# Patient Record
Sex: FEMALE | Race: WHITE | HISPANIC OR LATINO
[De-identification: names, ages, dates, MRNs, and addresses within clinical notes are randomized per-mention and may not be internally consistent; named-entity substitution may affect disease eponyms.]

---

## 2017-01-25 ENCOUNTER — APPOINTMENT (OUTPATIENT)
Dept: INTERNAL MEDICINE | Facility: CLINIC | Age: 82
End: 2017-01-25

## 2017-01-25 VITALS
HEIGHT: 60 IN | WEIGHT: 113 LBS | OXYGEN SATURATION: 96 % | SYSTOLIC BLOOD PRESSURE: 116 MMHG | TEMPERATURE: 98 F | BODY MASS INDEX: 22.19 KG/M2 | DIASTOLIC BLOOD PRESSURE: 58 MMHG | HEART RATE: 68 BPM

## 2017-01-26 LAB
ALBUMIN SERPL ELPH-MCNC: 4 G/DL
ALP BLD-CCNC: 136 U/L
ALT SERPL-CCNC: 17 U/L
AMYLASE/CREAT SERPL: 65 U/L
AST SERPL-CCNC: 21 U/L
BASOPHILS # BLD AUTO: 0.02 K/UL
BASOPHILS NFR BLD AUTO: 0.5 %
BILIRUB DIRECT SERPL-MCNC: 0.1 MG/DL
BILIRUB INDIRECT SERPL-MCNC: 0.1 MG/DL
BILIRUB SERPL-MCNC: <0.2 MG/DL
EOSINOPHIL # BLD AUTO: 0.16 K/UL
EOSINOPHIL NFR BLD AUTO: 4.2 %
FERRITIN SERPL-MCNC: 97.7 NG/ML
FOLATE SERPL-MCNC: >20 NG/ML
HCT VFR BLD CALC: 36.3 %
HGB BLD-MCNC: 11.5 G/DL
IMM GRANULOCYTES NFR BLD AUTO: 0 %
LPL SERPL-CCNC: 39 U/L
LYMPHOCYTES # BLD AUTO: 1.54 K/UL
LYMPHOCYTES NFR BLD AUTO: 40.4 %
MAN DIFF?: NORMAL
MCHC RBC-ENTMCNC: 30.9 PG
MCHC RBC-ENTMCNC: 31.7 GM/DL
MCV RBC AUTO: 97.6 FL
MONOCYTES # BLD AUTO: 0.29 K/UL
MONOCYTES NFR BLD AUTO: 7.6 %
NEUTROPHILS # BLD AUTO: 1.8 K/UL
NEUTROPHILS NFR BLD AUTO: 47.3 %
PLATELET # BLD AUTO: 191 K/UL
PROT SERPL-MCNC: 7 G/DL
RBC # BLD: 3.72 M/UL
RBC # FLD: 13.1 %
VIT B12 SERPL-MCNC: 579 PG/ML
WBC # FLD AUTO: 3.81 K/UL

## 2017-02-24 ENCOUNTER — FORM ENCOUNTER (OUTPATIENT)
Age: 82
End: 2017-02-24

## 2017-02-25 ENCOUNTER — APPOINTMENT (OUTPATIENT)
Dept: ULTRASOUND IMAGING | Facility: IMAGING CENTER | Age: 82
End: 2017-02-25

## 2017-02-25 ENCOUNTER — APPOINTMENT (OUTPATIENT)
Dept: RADIOLOGY | Facility: IMAGING CENTER | Age: 82
End: 2017-02-25

## 2017-02-25 ENCOUNTER — OUTPATIENT (OUTPATIENT)
Dept: OUTPATIENT SERVICES | Facility: HOSPITAL | Age: 82
LOS: 1 days | End: 2017-02-25
Payer: MEDICARE

## 2017-02-25 DIAGNOSIS — Z00.8 ENCOUNTER FOR OTHER GENERAL EXAMINATION: ICD-10-CM

## 2017-02-25 DIAGNOSIS — Z90.710 ACQUIRED ABSENCE OF BOTH CERVIX AND UTERUS: Chronic | ICD-10-CM

## 2017-02-25 PROCEDURE — 76700 US EXAM ABDOM COMPLETE: CPT

## 2017-02-25 PROCEDURE — 71046 X-RAY EXAM CHEST 2 VIEWS: CPT

## 2017-03-01 DIAGNOSIS — J18.9 PNEUMONIA, UNSPECIFIED ORGANISM: ICD-10-CM

## 2017-03-01 DIAGNOSIS — R10.9 UNSPECIFIED ABDOMINAL PAIN: ICD-10-CM

## 2017-03-28 ENCOUNTER — APPOINTMENT (OUTPATIENT)
Dept: INTERNAL MEDICINE | Facility: CLINIC | Age: 82
End: 2017-03-28

## 2017-03-28 VITALS
OXYGEN SATURATION: 97 % | HEIGHT: 60 IN | TEMPERATURE: 97.8 F | DIASTOLIC BLOOD PRESSURE: 50 MMHG | WEIGHT: 114 LBS | HEART RATE: 73 BPM | BODY MASS INDEX: 22.38 KG/M2 | SYSTOLIC BLOOD PRESSURE: 120 MMHG

## 2017-03-28 DIAGNOSIS — I10 ESSENTIAL (PRIMARY) HYPERTENSION: ICD-10-CM

## 2017-03-28 DIAGNOSIS — R10.9 UNSPECIFIED ABDOMINAL PAIN: ICD-10-CM

## 2017-06-08 ENCOUNTER — APPOINTMENT (OUTPATIENT)
Dept: INTERNAL MEDICINE | Facility: CLINIC | Age: 82
End: 2017-06-08

## 2017-06-08 VITALS
HEIGHT: 60 IN | BODY MASS INDEX: 22.78 KG/M2 | HEART RATE: 80 BPM | WEIGHT: 116 LBS | DIASTOLIC BLOOD PRESSURE: 56 MMHG | OXYGEN SATURATION: 98 % | SYSTOLIC BLOOD PRESSURE: 118 MMHG | TEMPERATURE: 97.8 F

## 2017-06-08 LAB — S PYO AG SPEC QL IA: NEGATIVE

## 2017-06-08 RX ORDER — BENZONATATE 100 MG/1
100 CAPSULE ORAL
Qty: 42 | Refills: 0 | Status: ACTIVE | COMMUNITY
Start: 2017-06-08 | End: 1900-01-01

## 2017-06-08 RX ORDER — FLUTICASONE PROPIONATE 50 UG/1
50 SPRAY, METERED NASAL TWICE DAILY
Qty: 1 | Refills: 0 | Status: ACTIVE | COMMUNITY
Start: 2017-06-08 | End: 1900-01-01

## 2017-06-09 LAB
RAPID RVP RESULT: DETECTED
RV+EV RNA SPEC QL NAA+PROBE: DETECTED

## 2017-06-15 ENCOUNTER — APPOINTMENT (OUTPATIENT)
Dept: INTERNAL MEDICINE | Facility: CLINIC | Age: 82
End: 2017-06-15

## 2017-06-15 VITALS
TEMPERATURE: 98 F | HEIGHT: 60 IN | HEART RATE: 71 BPM | SYSTOLIC BLOOD PRESSURE: 122 MMHG | DIASTOLIC BLOOD PRESSURE: 64 MMHG | OXYGEN SATURATION: 98 % | BODY MASS INDEX: 22.78 KG/M2 | WEIGHT: 116 LBS

## 2017-06-15 DIAGNOSIS — J06.9 ACUTE UPPER RESPIRATORY INFECTION, UNSPECIFIED: ICD-10-CM

## 2017-06-29 ENCOUNTER — MEDICATION RENEWAL (OUTPATIENT)
Age: 82
End: 2017-06-29

## 2017-06-29 RX ORDER — LEVOCETIRIZINE DIHYDROCHLORIDE 5 MG/1
5 TABLET ORAL DAILY
Qty: 15 | Refills: 0 | Status: ACTIVE | COMMUNITY
Start: 2017-06-08 | End: 1900-01-01

## 2017-10-03 ENCOUNTER — APPOINTMENT (OUTPATIENT)
Dept: INTERNAL MEDICINE | Facility: CLINIC | Age: 82
End: 2017-10-03
Payer: MEDICARE

## 2017-10-03 VITALS
TEMPERATURE: 97.9 F | OXYGEN SATURATION: 96 % | HEIGHT: 60 IN | HEART RATE: 72 BPM | WEIGHT: 116 LBS | BODY MASS INDEX: 22.78 KG/M2 | DIASTOLIC BLOOD PRESSURE: 60 MMHG | SYSTOLIC BLOOD PRESSURE: 130 MMHG

## 2017-10-03 DIAGNOSIS — G89.29 PAIN IN RIGHT KNEE: ICD-10-CM

## 2017-10-03 DIAGNOSIS — M25.561 PAIN IN RIGHT KNEE: ICD-10-CM

## 2017-10-03 PROCEDURE — 99213 OFFICE O/P EST LOW 20 MIN: CPT | Mod: 25

## 2017-10-03 PROCEDURE — G0008: CPT

## 2017-10-03 PROCEDURE — 90662 IIV NO PRSV INCREASED AG IM: CPT

## 2018-02-22 ENCOUNTER — OTHER (OUTPATIENT)
Age: 83
End: 2018-02-22

## 2018-03-16 ENCOUNTER — INPATIENT (INPATIENT)
Facility: HOSPITAL | Age: 83
LOS: 1 days | Discharge: ANOTHER HOSPITAL | End: 2018-03-18
Attending: INTERNAL MEDICINE | Admitting: INTERNAL MEDICINE
Payer: MEDICARE

## 2018-03-16 VITALS
SYSTOLIC BLOOD PRESSURE: 180 MMHG | OXYGEN SATURATION: 98 % | HEIGHT: 60 IN | HEART RATE: 78 BPM | DIASTOLIC BLOOD PRESSURE: 97 MMHG | WEIGHT: 104.94 LBS | RESPIRATION RATE: 16 BRPM

## 2018-03-16 DIAGNOSIS — I62.00 NONTRAUMATIC SUBDURAL HEMORRHAGE, UNSPECIFIED: ICD-10-CM

## 2018-03-16 DIAGNOSIS — R55 SYNCOPE AND COLLAPSE: ICD-10-CM

## 2018-03-16 DIAGNOSIS — Z90.710 ACQUIRED ABSENCE OF BOTH CERVIX AND UTERUS: Chronic | ICD-10-CM

## 2018-03-16 DIAGNOSIS — I10 ESSENTIAL (PRIMARY) HYPERTENSION: ICD-10-CM

## 2018-03-16 LAB
ALBUMIN SERPL ELPH-MCNC: 3.9 G/DL — SIGNIFICANT CHANGE UP (ref 3.3–5)
ALP SERPL-CCNC: 169 U/L — HIGH (ref 40–120)
ALT FLD-CCNC: 21 U/L — SIGNIFICANT CHANGE UP (ref 12–78)
ANION GAP SERPL CALC-SCNC: 6 MMOL/L — SIGNIFICANT CHANGE UP (ref 5–17)
APTT BLD: 29.4 SEC — SIGNIFICANT CHANGE UP (ref 27.5–37.4)
AST SERPL-CCNC: 25 U/L — SIGNIFICANT CHANGE UP (ref 15–37)
BILIRUB SERPL-MCNC: 0.4 MG/DL — SIGNIFICANT CHANGE UP (ref 0.2–1.2)
BUN SERPL-MCNC: 11 MG/DL — SIGNIFICANT CHANGE UP (ref 7–23)
CALCIUM SERPL-MCNC: 8.9 MG/DL — SIGNIFICANT CHANGE UP (ref 8.5–10.1)
CHLORIDE SERPL-SCNC: 97 MMOL/L — SIGNIFICANT CHANGE UP (ref 96–108)
CK SERPL-CCNC: 79 U/L — SIGNIFICANT CHANGE UP (ref 26–192)
CK SERPL-CCNC: 89 U/L — SIGNIFICANT CHANGE UP (ref 26–192)
CO2 SERPL-SCNC: 31 MMOL/L — SIGNIFICANT CHANGE UP (ref 22–31)
CREAT SERPL-MCNC: 0.6 MG/DL — SIGNIFICANT CHANGE UP (ref 0.5–1.3)
GLUCOSE SERPL-MCNC: 130 MG/DL — HIGH (ref 70–99)
HCT VFR BLD CALC: 39.5 % — SIGNIFICANT CHANGE UP (ref 34.5–45)
HGB BLD-MCNC: 13.8 G/DL — SIGNIFICANT CHANGE UP (ref 11.5–15.5)
INR BLD: 0.98 RATIO — SIGNIFICANT CHANGE UP (ref 0.88–1.16)
MCHC RBC-ENTMCNC: 32.8 PG — SIGNIFICANT CHANGE UP (ref 27–34)
MCHC RBC-ENTMCNC: 34.9 GM/DL — SIGNIFICANT CHANGE UP (ref 32–36)
MCV RBC AUTO: 93.8 FL — SIGNIFICANT CHANGE UP (ref 80–100)
NRBC # BLD: 0 /100 WBCS — SIGNIFICANT CHANGE UP (ref 0–0)
PLATELET # BLD AUTO: 192 K/UL — SIGNIFICANT CHANGE UP (ref 150–400)
POTASSIUM SERPL-MCNC: 3.9 MMOL/L — SIGNIFICANT CHANGE UP (ref 3.5–5.3)
POTASSIUM SERPL-SCNC: 3.9 MMOL/L — SIGNIFICANT CHANGE UP (ref 3.5–5.3)
PROT SERPL-MCNC: 8 GM/DL — SIGNIFICANT CHANGE UP (ref 6–8.3)
PROTHROM AB SERPL-ACNC: 10.7 SEC — SIGNIFICANT CHANGE UP (ref 9.8–12.7)
RBC # BLD: 4.21 M/UL — SIGNIFICANT CHANGE UP (ref 3.8–5.2)
RBC # FLD: 12.1 % — SIGNIFICANT CHANGE UP (ref 10.3–14.5)
SODIUM SERPL-SCNC: 134 MMOL/L — LOW (ref 135–145)
TROPONIN I SERPL-MCNC: <.015 NG/ML — SIGNIFICANT CHANGE UP (ref 0.01–0.04)
WBC # BLD: 3.93 K/UL — SIGNIFICANT CHANGE UP (ref 3.8–10.5)
WBC # FLD AUTO: 3.93 K/UL — SIGNIFICANT CHANGE UP (ref 3.8–10.5)

## 2018-03-16 PROCEDURE — 76377 3D RENDER W/INTRP POSTPROCES: CPT | Mod: 26

## 2018-03-16 PROCEDURE — 93010 ELECTROCARDIOGRAM REPORT: CPT

## 2018-03-16 PROCEDURE — 70450 CT HEAD/BRAIN W/O DYE: CPT | Mod: 26

## 2018-03-16 PROCEDURE — 72125 CT NECK SPINE W/O DYE: CPT | Mod: 26

## 2018-03-16 PROCEDURE — 99284 EMERGENCY DEPT VISIT MOD MDM: CPT

## 2018-03-16 PROCEDURE — 72170 X-RAY EXAM OF PELVIS: CPT | Mod: 26

## 2018-03-16 RX ORDER — METOPROLOL TARTRATE 50 MG
5 TABLET ORAL ONCE
Qty: 0 | Refills: 0 | Status: DISCONTINUED | OUTPATIENT
Start: 2018-03-16 | End: 2018-03-16

## 2018-03-16 RX ORDER — HYDRALAZINE HCL 50 MG
10 TABLET ORAL ONCE
Qty: 0 | Refills: 0 | Status: COMPLETED | OUTPATIENT
Start: 2018-03-16 | End: 2018-03-16

## 2018-03-16 RX ORDER — ACETAMINOPHEN 500 MG
650 TABLET ORAL ONCE
Qty: 0 | Refills: 0 | Status: DISCONTINUED | OUTPATIENT
Start: 2018-03-16 | End: 2018-03-16

## 2018-03-16 RX ORDER — DOCUSATE SODIUM 100 MG
100 CAPSULE ORAL THREE TIMES A DAY
Qty: 0 | Refills: 0 | Status: DISCONTINUED | OUTPATIENT
Start: 2018-03-16 | End: 2018-03-16

## 2018-03-16 RX ORDER — METOPROLOL TARTRATE 50 MG
5 TABLET ORAL ONCE
Qty: 0 | Refills: 0 | Status: COMPLETED | OUTPATIENT
Start: 2018-03-16 | End: 2018-03-16

## 2018-03-16 RX ORDER — PANTOPRAZOLE SODIUM 20 MG/1
40 TABLET, DELAYED RELEASE ORAL
Qty: 0 | Refills: 0 | Status: DISCONTINUED | OUTPATIENT
Start: 2018-03-16 | End: 2018-03-18

## 2018-03-16 RX ORDER — DOCUSATE SODIUM 100 MG
100 CAPSULE ORAL THREE TIMES A DAY
Qty: 0 | Refills: 0 | Status: DISCONTINUED | OUTPATIENT
Start: 2018-03-16 | End: 2018-03-18

## 2018-03-16 RX ORDER — ACETAMINOPHEN 500 MG
650 TABLET ORAL EVERY 6 HOURS
Qty: 0 | Refills: 0 | Status: DISCONTINUED | OUTPATIENT
Start: 2018-03-16 | End: 2018-03-18

## 2018-03-16 RX ORDER — METOPROLOL TARTRATE 50 MG
25 TABLET ORAL
Qty: 0 | Refills: 0 | Status: DISCONTINUED | OUTPATIENT
Start: 2018-03-16 | End: 2018-03-18

## 2018-03-16 RX ORDER — ONDANSETRON 8 MG/1
4 TABLET, FILM COATED ORAL EVERY 6 HOURS
Qty: 0 | Refills: 0 | Status: DISCONTINUED | OUTPATIENT
Start: 2018-03-16 | End: 2018-03-18

## 2018-03-16 RX ORDER — ACETAMINOPHEN 500 MG
650 TABLET ORAL EVERY 6 HOURS
Qty: 0 | Refills: 0 | Status: DISCONTINUED | OUTPATIENT
Start: 2018-03-16 | End: 2018-03-16

## 2018-03-16 RX ORDER — ACETAMINOPHEN 500 MG
650 TABLET ORAL ONCE
Qty: 0 | Refills: 0 | Status: COMPLETED | OUTPATIENT
Start: 2018-03-16 | End: 2018-03-16

## 2018-03-16 RX ADMIN — Medication 650 MILLIGRAM(S): at 10:14

## 2018-03-16 RX ADMIN — Medication 5 MILLIGRAM(S): at 13:50

## 2018-03-16 RX ADMIN — Medication 5 MILLIGRAM(S): at 11:22

## 2018-03-16 RX ADMIN — Medication 25 MILLIGRAM(S): at 18:57

## 2018-03-16 RX ADMIN — Medication 650 MILLIGRAM(S): at 17:27

## 2018-03-16 RX ADMIN — Medication 650 MILLIGRAM(S): at 11:15

## 2018-03-16 RX ADMIN — Medication 650 MILLIGRAM(S): at 15:13

## 2018-03-16 NOTE — ED PROVIDER NOTE - CARE PLAN
Principal Discharge DX:	Acute non intractable tension-type headache Principal Discharge DX:	Subdural hematoma

## 2018-03-16 NOTE — CONSULT NOTE ADULT - ASSESSMENT
Historian:     Pt poor historian.  ER notes reviewed.  Brain CT:  pending  HPI:    SALVADOR MARKS.  · Chief Complaint: The patient is a 89y Female complaining of dizziness.  · HPI Objective Statement: 89 yo F with dizziness.  Presents s/p fall at home.  Pt. complains of headache and dizziness after the fall.     	Used [employer]  to speak with patient.  She says she's doesn't recall exactly what happened.  No other complaints.  No other traumatic injury.      	ROS: negative for fever, cough, chest pain, shortness of breath, abd pain, nausea, vomiting, diarrhea, rash, paresthesia, and weakness.   PMH: gerd; Meds: See EMR; SH: Denies smoking/drinking/drug use      PAST MEDICAL & SURGICAL HISTORY:  No pertinent past medical history  S/P hysterectomy  89yFemale    MEDICATIONS  (STANDING):  docusate sodium 100 milliGRAM(s) Oral three times a day  metoprolol     tartrate 25 milliGRAM(s) Oral two times a day  pantoprazole    Tablet 40 milliGRAM(s) Oral before breakfast    MEDICATIONS  (PRN):  acetaminophen   Tablet. 650 milliGRAM(s) Oral every 6 hours PRN Mild Pain (1 - 3)  ondansetron Injectable 4 milliGRAM(s) IV Push every 6 hours PRN Nausea      Allergies    No Known Allergies    Intolerances      FAMILY HISTORY:  No pertinent family history in first degree relatives    Vital Signs Last 24 Hrs  T(C): --  T(F): --  HR: 61 (16 Mar 2018 11:25) (61 - 78)  BP: 192/72 (16 Mar 2018 11:25) (180/97 - 194/67)  BP(mean): --  RR: 16 (16 Mar 2018 09:16) (16 - 16)  SpO2: 98% (16 Mar 2018 09:16) (98% - 98%)    NEUROLOGICAL EXAM:  HENT:  Normocephalic head; atraumatic head.  Neck supple.  ENT: normal looking.  Mental State:    Alert.  Fully oriented to person, place and date.  Coherent.  Speech clear and intact.  Cooperative.  Responds appropriately.    Cranial Nerves:  II-XII:   Pupils round and reactive to light and accommodation.  Extraocular movements full.  Visual fields full (no homonymous hemianopsia).  Visual acuity wnl.  Facial symmetry intact.  Tongue midline.  Motor Functions:  Moves all extremities.  No pronator drift of UE.  Claps hand well.  Hand  intact bilaterally.  Ambulatory.    Sensory Functions:   Intact to touch and pinprick to face and extremities.    Reflexes:  Deep tendon reflexes normoactive to biceps, knees and ankles.  Babinski absent (present).  Cerebellar Testing:    Finger to nose intact.  Nystagmus absent.  Neurovascular: Carotid auscultation full without bruits.      LABS:                        13.8   3.93  )-----------( 192      ( 16 Mar 2018 10:19 )             39.5     03-16    134<L>  |  97  |  11  ----------------------------<  130<H>  3.9   |  31  |  0.60    Ca    8.9      16 Mar 2018 10:19    TPro  8.0  /  Alb  3.9  /  TBili  0.4  /  DBili  x   /  AST  25  /  ALT  21  /  AlkPhos  169<H>  03-16    PT/INR - ( 16 Mar 2018 10:19 )   PT: 10.7 sec;   INR: 0.98 ratio         PTT - ( 16 Mar 2018 10:19 )  PTT:29.4 sec        RADIOLOGY & ADDITIONAL STUDIES:    CT Head No Cont: Urgent   Indication: headache  Transport: Stretcher-Crib  Exam Completed  Provider's Contact #: [phone] (03-16 @ 09:23)  CT Cervical Spine No Cont: Urgent   Indication: neck pain  Transport: Stretcher-Crib  Exam Completed  Provider's Contact #: [phone] (03-16 @ 09:23)  Xray Chest 1 View AP/PA.: Urgent   Indication: chest pain  Transport: Stretcher-Crib  Provider's Contact #: [phone] (03-16 @ 09:23)  Xray Pelvis AP only: Urgent   Indication: pelvic pain  Transport: Stretcher-Crib  Provider's Contact #: [phone] (03-16 @ 09:23)  Complete Blood Count: STAT (03-16 @ 09:23)  Comprehensive Metabolic Panel: STAT (03-16 @ 09:23)  12 Lead ECG:   Provider's Contact #: [phone] (03-16 @ 09:23)  IV Insert:     Time/Priority:  STAT (03-16 @ 09:23)  Prothrombin Time and INR, Plasma:  Start Date:16-Mar-2018. STAT (03-16 @ 09:23)  Activated Partial Thromboplastin Time:  Start Date:16-Mar-2018. STAT (03-16 @ 09:23)  Type + Screen: STAT (03-16 @ 09:23)  ABO Rh Confirmatory Specimen: STAT  Addl Info: Conditional: ABO Rh Confirmatory Specimen (03-16 @ 09:23)  Urinalysis: STAT (03-16 @ 09:29)  Culture - Urine: Routine  Specimen Source: Clean Catch (Midstream) (03-16 @ 09:29)  acetaminophen   Tablet.: [Known as TYLENOL.]  650 milliGRAM(s), Oral, once, Stop After 1 Doses  Administration Instructions: MAX DAILY DOSE:  ADULT = 4,000 mG/Day (03-16 @ 09:32)  Troponin I, Serum: STAT (03-16 @ 09:33)  Antibody Screen: 10:12 (03-16 @ 10:19)  metoprolol    tartrate Injectable: [Known as LOPRESSOR Injectable]  5 milliGRAM(s), IV Push, Once, Stop After 1 Doses  Special Instructions: check pressure before dispensing  Provider's Contact #: [phone] (03-16 @ 11:05)  Admit to Inpatient Level of Care.:     Service:  TELEMETRY    Physician:  Bianca Hastings    Additional Instructions:  Diagnosis: Subdural hematoma  Isolation: None  Special Consideration: None (03-16 @ 11:08)  CT 3D Reconstruct w/ Workstation: 11:15  Exam in Progress (03-16 @ 11:15)  Admit from ED (03-16 @ 11:18)  Intermittent Pneumatic Compression:     Body Side:  Bilateral    Additional Instructions:  Apply device now and remove only for bathing and skin evaluation as per nursing protocol. (03-16 @ 11:19)  Height/Length:     Frequency:  on admission (03-16 @ 11:19)  Weight:     Frequency:  on admission (03-16 @ 11:19)  Vital Signs:     Frequency:  every 8 hours (03-16 @ 11:19)  Vital Signs:     Frequency:  every 6 hours (03-16 @ 11:19)  Assess Neurological Status:     Type of Neuro Check:  General    Frequency:  every 4 hours (03-16 @ 11:19)  Fall Risk Protocol:     Time/Priority:  Routine (03-16 @ 11:19)  Diet, DASH/TLC:   Sodium & Cholesterol Restricted (03-16 @ 11:19)  acetaminophen   Tablet.: [Known as TYLENOL.]  650 milliGRAM(s), Oral, every 6 hours, PRN for Mild Pain (1 - 3)  Administration Instructions: MAX DAILY DOSE:  ADULT = 4,000 mG/Day (03-16 @ 11:19)  ondansetron Injectable: [Known as ZOFRAN Injectable]  4 milliGRAM(s), IV Push, every 6 hours, PRN for Nausea  Administration Instructions: Max IV Push dose 8 milliGRAM(s)  IF IV PUSH, ADMINISTER OVER 2-5 MIN  Provider's Contact #: [phone] (03-16 @ 11:19)  docusate sodium: [Known as COLACE]  100 milliGRAM(s), Oral, three times a day  Provider's Contact #: [phone] (03-16 @ 11:19)  (ADM OVERRIDE):   Qty Removed: 1 each  Route - Dose Given <see task> (03-16 @ 11:22)  pantoprazole    Tablet: [Known as PROTONIX   DR]  40 milliGRAM(s), Oral, before breakfast  Administration Instructions: swallow whole * don't crush/chew (03-16 @ 11:23)  US Duplex Carotid Arteries Complete, Bilateral: Routine   Indication: Syncope  Transport: Stretcher-Crib  Provider's Contact #: [phone] (03-16 @ 11:23)  Troponin I, Serum: Repeat From: 16-Mar-2018 15:00 To: 17-Mar-2018 07:00, Every 8 hr(s) (03-16 @ 11:23)  Troponin I, Serum: 15:00 (03-16 @ 11:23)  Creatine Kinase, Serum: Repeat From: 16-Mar-2018 15:00 To: 17-Mar-2018 07:00, Every 8 hr(s) (03-16 @ 11:23)  Creatine Kinase, Serum: 15:00 (03-16 @ 11:23)  metoprolol     tartrate: [Ordered as LOPRESSOR]  25 milliGRAM(s), Oral, two times a day  Special Instructions: hold for HR<60  Provider's Contact #: [phone] (03-16 @ 11:23)  Troponin I, Serum: 23:00 (03-16 @ 11:23)  Creatine Kinase, Serum: 23:00 (03-16 @ 11:23)  CT Head No Cont: AM   Indication: Subdural hematoma  Transport: Stretcher-Crib  Provider's Contact #: [phone] (03-16 @ 11:27)  Complete Blood Count: AM Sched. Collection: 17-Mar-2018 05:00 (03-16 @ 11:27)  Comprehensive Metabolic Panel: AM Sched. Collection: 17-Mar-2018 05:00 (03-16 @ 11:27)  (ADM OVERRIDE):   Qty Removed: 1 each  Route - Dose Given <see task> (03-16 @ 11:48)    Assessment & Opinion:   Transient Ischemic Attack    Recommendations:  Brain MRI.  Carotid doppler.  Echocardiogram.  EEG.   DVT prophylaxis as ordered.  Medications: Historian:     Pt poor historian.  ER notes reviewed.  Brain CT:   Acute holohemispheric right subdural hematoma measuring 7 mm in maximal depth with 3 mm of leftward midline shift. Small foci of subarachnoid hemorrhage within the frontal lobes as well as trace left frontal subdural hematoma.   HPI:    SALVADOR MARKS.  · Chief Complaint: The patient is a 89y Female complaining of dizziness.  · HPI Objective Statement: 89 yo F with dizziness.  Presents s/p fall at home.  Pt. complains of headache and dizziness after the fall.     	Used [employer]  to speak with patient.  She says she's doesn't recall exactly what happened.  No other complaints.  No other traumatic injury.      	ROS: negative for fever, cough, chest pain, shortness of breath, abd pain, nausea, vomiting, diarrhea, rash, paresthesia, and weakness.   PMH: gerd; Meds: See EMR; SH: Denies smoking/drinking/drug use    PAST MEDICAL & SURGICAL HISTORY:  No pertinent past medical history; S/P hysterectomy    MEDICATIONS  (STANDING):  docusate sodium 100 milliGRAM(s) Oral three times a day  metoprolol     tartrate 25 milliGRAM(s) Oral two times a day  pantoprazole    Tablet 40 milliGRAM(s) Oral before breakfast    MEDICATIONS  (PRN):  acetaminophen   Tablet. 650 milliGRAM(s) Oral every 6 hours PRN Mild Pain (1 - 3)  ondansetron Injectable 4 milliGRAM(s) IV Push every 6 hours PRN Nausea    Allergies: No Known Allergies  Intolerances  FAMILY HISTORY:  No pertinent family history in first degree relatives    Vital Signs Last 24 Hrs  T(C): --  T(F): --  HR: 61 (16 Mar 2018 11:25) (61 - 78)  BP: 192/72 (16 Mar 2018 11:25) (180/97 - 194/67)  BP(mean): --  RR: 16 (16 Mar 2018 09:16) (16 - 16)  SpO2: 98% (16 Mar 2018 09:16) (98% - 98%)    NEUROLOGICAL EXAM:  HENT:  Normocephalic head; atraumatic head.  Neck supple.  ENT: normal looking.  Mental State:    Awake.  Poorly oriented to place and date.   Speech clear.   Cooperative.  Responds fairly appropriately.    Cranial Nerves:   Pupils round and reactive to light.   Extraocular movements full.   Visual acuity counts fingers.   Facial symmetry intact.  Tongue midline.  Motor Functions:  Moves all extremities weakly.  Sensory Functions:   Intact to touch and pinprick to extremities.    Reflexes:  Deep tendon reflexes normoactive to biceps, knees and ankles.  Babinski absent   Cerebellar Testing:    Claps hands well.   Neurovascular: Carotid auscultation full without bruits.      LABS:                        13.8   3.93  )-----------( 192      ( 16 Mar 2018 10:19 )             39.5     03-16    134<L>  |  97  |  11  ----------------------------<  130<H>  3.9   |  31  |  0.60    Ca    8.9      16 Mar 2018 10:19    TPro  8.0  /  Alb  3.9  /  TBili  0.4  /  DBili  x   /  AST  25  /  ALT  21  /  AlkPhos  169<H>  03-16    PT/INR - ( 16 Mar 2018 10:19 )   PT: 10.7 sec;   INR: 0.98 ratio       PTT - ( 16 Mar 2018 10:19 )  PTT:29.4 sec    RADIOLOGY & ADDITIONAL STUDIES:    CT Head No Cont: Urgent   Indication: headache  Transport: Stretcher-Crib  Exam Completed  Provider's Contact #: [phone] (03-16 @ 09:23)  CT Cervical Spine No Cont: Urgent   Indication: neck pain  Transport: Stretcher-Crib  Exam Completed  Provider's Contact #: [phone] (03-16 @ 09:23)  Xray Chest 1 View AP/PA.: Urgent   Indication: chest pain  Transport: Stretcher-Crib  Provider's Contact #: [phone] (03-16 @ 09:23)  Xray Pelvis AP only: Urgent   Indication: pelvic pain  Transport: Stretcher-Crib  Provider's Contact #: [phone] (03-16 @ 09:23)  Complete Blood Count: STAT (03-16 @ 09:23)  Comprehensive Metabolic Panel: STAT (03-16 @ 09:23)  12 Lead ECG:   Provider's Contact #: [phone] (03-16 @ 09:23)  IV Insert:     Time/Priority:  STAT (03-16 @ 09:23)  Prothrombin Time and INR, Plasma:  Start Date:16-Mar-2018. STAT (03-16 @ 09:23)  Activated Partial Thromboplastin Time:  Start Date:16-Mar-2018. STAT (03-16 @ 09:23)  Type + Screen: STAT (03-16 @ 09:23)  ABO Rh Confirmatory Specimen: STAT  Addl Info: Conditional: ABO Rh Confirmatory Specimen (03-16 @ 09:23)  Urinalysis: STAT (03-16 @ 09:29)  Culture - Urine: Routine  Specimen Source: Clean Catch (Midstream) (03-16 @ 09:29)  acetaminophen   Tablet.: [Known as TYLENOL.]  650 milliGRAM(s), Oral, once, Stop After 1 Doses  Administration Instructions: MAX DAILY DOSE:  ADULT = 4,000 mG/Day (03-16 @ 09:32)  Troponin I, Serum: STAT (03-16 @ 09:33)  Antibody Screen: 10:12 (03-16 @ 10:19)  metoprolol    tartrate Injectable: [Known as LOPRESSOR Injectable]  5 milliGRAM(s), IV Push, Once, Stop After 1 Doses  Special Instructions: check pressure before dispensing  Provider's Contact #: [phone] (03-16 @ 11:05)  Admit to Inpatient Level of Care.:     Service:  TELEMETRY    Physician:  Bianca Hastings    Additional Instructions:  Diagnosis: Subdural hematoma  Isolation: None  Special Consideration: None (03-16 @ 11:08)  CT 3D Reconstruct w/ Workstation: 11:15  Exam in Progress (03-16 @ 11:15)  Admit from ED (03-16 @ 11:18)  Intermittent Pneumatic Compression:     Body Side:  Bilateral    Additional Instructions:  Apply device now and remove only for bathing and skin evaluation as per nursing protocol. (03-16 @ 11:19)  Height/Length:     Frequency:  on admission (03-16 @ 11:19)  Weight:     Frequency:  on admission (03-16 @ 11:19)  Vital Signs:     Frequency:  every 8 hours (03-16 @ 11:19)  Vital Signs:     Frequency:  every 6 hours (03-16 @ 11:19)  Assess Neurological Status:     Type of Neuro Check:  General    Frequency:  every 4 hours (03-16 @ 11:19)  Fall Risk Protocol:     Time/Priority:  Routine (03-16 @ 11:19)  Diet, DASH/TLC:   Sodium & Cholesterol Restricted (03-16 @ 11:19)  acetaminophen   Tablet.: [Known as TYLENOL.]  650 milliGRAM(s), Oral, every 6 hours, PRN for Mild Pain (1 - 3)  Administration Instructions: MAX DAILY DOSE:  ADULT = 4,000 mG/Day (03-16 @ 11:19)  ondansetron Injectable: [Known as ZOFRAN Injectable]  4 milliGRAM(s), IV Push, every 6 hours, PRN for Nausea  Administration Instructions: Max IV Push dose 8 milliGRAM(s)  IF IV PUSH, ADMINISTER OVER 2-5 MIN  Provider's Contact #: [phone] (03-16 @ 11:19)  docusate sodium: [Known as COLACE]  100 milliGRAM(s), Oral, three times a day  Provider's Contact #: [phone] (03-16 @ 11:19)  (ADM OVERRIDE):   Qty Removed: 1 each  Route - Dose Given <see task> (03-16 @ 11:22)  pantoprazole    Tablet: [Known as PROTONIX   DR]  40 milliGRAM(s), Oral, before breakfast  Administration Instructions: swallow whole * don't crush/chew (03-16 @ 11:23)  US Duplex Carotid Arteries Complete, Bilateral: Routine   Indication: Syncope  Transport: Stretcher-Crib  Provider's Contact #: [phone] (03-16 @ 11:23)  Troponin I, Serum: Repeat From: 16-Mar-2018 15:00 To: 17-Mar-2018 07:00, Every 8 hr(s) (03-16 @ 11:23)  Troponin I, Serum: 15:00 (03-16 @ 11:23)  Creatine Kinase, Serum: Repeat From: 16-Mar-2018 15:00 To: 17-Mar-2018 07:00, Every 8 hr(s) (03-16 @ 11:23)  Creatine Kinase, Serum: 15:00 (03-16 @ 11:23)  metoprolol     tartrate: [Ordered as LOPRESSOR]  25 milliGRAM(s), Oral, two times a day  Special Instructions: hold for HR<60  Provider's Contact #: [phone] (03-16 @ 11:23)  Troponin I, Serum: 23:00 (03-16 @ 11:23)  Creatine Kinase, Serum: 23:00 (03-16 @ 11:23)  CT Head No Cont: AM   Indication: Subdural hematoma  Transport: Stretcher-Crib  Provider's Contact #: [phone] (03-16 @ 11:27)  Complete Blood Count: AM Sched. Collection: 17-Mar-2018 05:00 (03-16 @ 11:27)  Comprehensive Metabolic Panel: AM Sched. Collection: 17-Mar-2018 05:00 (03-16 @ 11:27)  (ADM OVERRIDE):   Qty Removed: 1 each  Route - Dose Given <see task> (03-16 @ 11:48)    Assessment & Opinion:   Transient Ischemic Attack.  Brain CT: Acute holohemispheric right subdural hematoma measuring 7 mm in maximal depth with 3 mm of leftward midline shift. Small foci of subarachnoid hemorrhage within the frontal lobes as well as trace left frontal subdural hematoma.     Recommendations:  Brain MRI.  Carotid doppler.  Echocardiogram.  EEG.   PT/OT.  DVT prophylaxis as ordered.  Medications:  Continue current medications Historian:     Pt poor historian.  ER notes reviewed.  Brain CT:   Acute holohemispheric right subdural hematoma measuring 7 mm in maximal depth with 3 mm of leftward midline shift. Small foci of subarachnoid hemorrhage within the frontal lobes as well as trace left frontal subdural hematoma.   HPI:    SALVADOR MARKS.  · Chief Complaint: The patient is a 89y Female complaining of dizziness.  · HPI Objective Statement: 89 yo F with dizziness.  Presents s/p fall at home.  Pt. complains of headache and dizziness after the fall.     	Used [employer]  to speak with patient.  She says she's doesn't recall exactly what happened.  No other complaints.  No other traumatic injury.      	ROS: negative for fever, cough, chest pain, shortness of breath, abd pain, nausea, vomiting, diarrhea, rash, paresthesia, and weakness.   PMH: gerd; Meds: See EMR; SH: Denies smoking/drinking/drug use    PAST MEDICAL & SURGICAL HISTORY:  No pertinent past medical history; S/P hysterectomy    MEDICATIONS  (STANDING):  docusate sodium 100 milliGRAM(s) Oral three times a day  metoprolol     tartrate 25 milliGRAM(s) Oral two times a day  pantoprazole    Tablet 40 milliGRAM(s) Oral before breakfast    MEDICATIONS  (PRN):  acetaminophen   Tablet. 650 milliGRAM(s) Oral every 6 hours PRN Mild Pain (1 - 3)  ondansetron Injectable 4 milliGRAM(s) IV Push every 6 hours PRN Nausea    Allergies: No Known Allergies  Intolerances  FAMILY HISTORY:  No pertinent family history in first degree relatives    Vital Signs Last 24 Hrs  T(C): --  T(F): --  HR: 61 (16 Mar 2018 11:25) (61 - 78)  BP: 192/72 (16 Mar 2018 11:25) (180/97 - 194/67)  BP(mean): --  RR: 16 (16 Mar 2018 09:16) (16 - 16)  SpO2: 98% (16 Mar 2018 09:16) (98% - 98%)    NEUROLOGICAL EXAM:  HENT:  Normocephalic head; atraumatic head.  Neck supple.  ENT: normal looking.  Mental State:    Awake.  Poorly oriented to place and date.   Speech clear.   Cooperative.  Responds fairly appropriately.    Cranial Nerves:   Pupils round and reactive to light.   Extraocular movements full.   Visual acuity counts fingers.   Facial symmetry intact.  Tongue midline.  Motor Functions:  Moves all extremities weakly.  Sensory Functions:   Intact to touch and pinprick to extremities.    Reflexes:  Deep tendon reflexes normoactive to biceps, knees and ankles.  Babinski absent   Cerebellar Testing:    Claps hands well.   Neurovascular: Carotid auscultation full without bruits.      LABS:                        13.8   3.93  )-----------( 192      ( 16 Mar 2018 10:19 )             39.5     03-16    134<L>  |  97  |  11  ----------------------------<  130<H>  3.9   |  31  |  0.60    Ca    8.9      16 Mar 2018 10:19    TPro  8.0  /  Alb  3.9  /  TBili  0.4  /  DBili  x   /  AST  25  /  ALT  21  /  AlkPhos  169<H>  03-16    PT/INR - ( 16 Mar 2018 10:19 )   PT: 10.7 sec;   INR: 0.98 ratio       PTT - ( 16 Mar 2018 10:19 )  PTT:29.4 sec    RADIOLOGY & ADDITIONAL STUDIES:    CT Head No Cont: Urgent   Indication: headache  Transport: Stretcher-Crib  Exam Completed  Provider's Contact #: [phone] (03-16 @ 09:23)  CT Cervical Spine No Cont: Urgent   Indication: neck pain  Transport: Stretcher-Crib  Exam Completed  Provider's Contact #: [phone] (03-16 @ 09:23)  Xray Chest 1 View AP/PA.: Urgent   Indication: chest pain  Transport: Stretcher-Crib  Provider's Contact #: [phone] (03-16 @ 09:23)  Xray Pelvis AP only: Urgent   Indication: pelvic pain  Transport: Stretcher-Crib  Provider's Contact #: [phone] (03-16 @ 09:23)  Complete Blood Count: STAT (03-16 @ 09:23)  Comprehensive Metabolic Panel: STAT (03-16 @ 09:23)  12 Lead ECG:   Provider's Contact #: [phone] (03-16 @ 09:23)  IV Insert:     Time/Priority:  STAT (03-16 @ 09:23)  Prothrombin Time and INR, Plasma:  Start Date:16-Mar-2018. STAT (03-16 @ 09:23)  Activated Partial Thromboplastin Time:  Start Date:16-Mar-2018. STAT (03-16 @ 09:23)  Type + Screen: STAT (03-16 @ 09:23)  ABO Rh Confirmatory Specimen: STAT  Addl Info: Conditional: ABO Rh Confirmatory Specimen (03-16 @ 09:23)  Urinalysis: STAT (03-16 @ 09:29)  Culture - Urine: Routine  Specimen Source: Clean Catch (Midstream) (03-16 @ 09:29)  acetaminophen   Tablet.: [Known as TYLENOL.]  650 milliGRAM(s), Oral, once, Stop After 1 Doses  Administration Instructions: MAX DAILY DOSE:  ADULT = 4,000 mG/Day (03-16 @ 09:32)  Troponin I, Serum: STAT (03-16 @ 09:33)  Antibody Screen: 10:12 (03-16 @ 10:19)  metoprolol    tartrate Injectable: [Known as LOPRESSOR Injectable]  5 milliGRAM(s), IV Push, Once, Stop After 1 Doses  Special Instructions: check pressure before dispensing  Provider's Contact #: [phone] (03-16 @ 11:05)  Admit to Inpatient Level of Care.:     Service:  TELEMETRY    Physician:  Bianca Hastings    Additional Instructions:  Diagnosis: Subdural hematoma  Isolation: None  Special Consideration: None (03-16 @ 11:08)  CT 3D Reconstruct w/ Workstation: 11:15  Exam in Progress (03-16 @ 11:15)  Admit from ED (03-16 @ 11:18)  Intermittent Pneumatic Compression:     Body Side:  Bilateral    Additional Instructions:  Apply device now and remove only for bathing and skin evaluation as per nursing protocol. (03-16 @ 11:19)  Height/Length:     Frequency:  on admission (03-16 @ 11:19)  Weight:     Frequency:  on admission (03-16 @ 11:19)  Vital Signs:     Frequency:  every 8 hours (03-16 @ 11:19)  Vital Signs:     Frequency:  every 6 hours (03-16 @ 11:19)  Assess Neurological Status:     Type of Neuro Check:  General    Frequency:  every 4 hours (03-16 @ 11:19)  Fall Risk Protocol:     Time/Priority:  Routine (03-16 @ 11:19)  Diet, DASH/TLC:   Sodium & Cholesterol Restricted (03-16 @ 11:19)  acetaminophen   Tablet.: [Known as TYLENOL.]  650 milliGRAM(s), Oral, every 6 hours, PRN for Mild Pain (1 - 3)  Administration Instructions: MAX DAILY DOSE:  ADULT = 4,000 mG/Day (03-16 @ 11:19)  ondansetron Injectable: [Known as ZOFRAN Injectable]  4 milliGRAM(s), IV Push, every 6 hours, PRN for Nausea  Administration Instructions: Max IV Push dose 8 milliGRAM(s)  IF IV PUSH, ADMINISTER OVER 2-5 MIN  Provider's Contact #: [phone] (03-16 @ 11:19)  docusate sodium: [Known as COLACE]  100 milliGRAM(s), Oral, three times a day  Provider's Contact #: [phone] (03-16 @ 11:19)  (ADM OVERRIDE):   Qty Removed: 1 each  Route - Dose Given <see task> (03-16 @ 11:22)  pantoprazole    Tablet: [Known as PROTONIX   DR]  40 milliGRAM(s), Oral, before breakfast  Administration Instructions: swallow whole * don't crush/chew (03-16 @ 11:23)  US Duplex Carotid Arteries Complete, Bilateral: Routine   Indication: Syncope  Transport: Stretcher-Crib  Provider's Contact #: [phone] (03-16 @ 11:23)  Troponin I, Serum: Repeat From: 16-Mar-2018 15:00 To: 17-Mar-2018 07:00, Every 8 hr(s) (03-16 @ 11:23)  Troponin I, Serum: 15:00 (03-16 @ 11:23)  Creatine Kinase, Serum: Repeat From: 16-Mar-2018 15:00 To: 17-Mar-2018 07:00, Every 8 hr(s) (03-16 @ 11:23)  Creatine Kinase, Serum: 15:00 (03-16 @ 11:23)  metoprolol     tartrate: [Ordered as LOPRESSOR]  25 milliGRAM(s), Oral, two times a day  Special Instructions: hold for HR<60  Provider's Contact #: [phone] (03-16 @ 11:23)  Troponin I, Serum: 23:00 (03-16 @ 11:23)  Creatine Kinase, Serum: 23:00 (03-16 @ 11:23)  CT Head No Cont: AM   Indication: Subdural hematoma  Transport: Stretcher-Crib  Provider's Contact #: [phone] (03-16 @ 11:27)  Complete Blood Count: AM Sched. Collection: 17-Mar-2018 05:00 (03-16 @ 11:27)  Comprehensive Metabolic Panel: AM Sched. Collection: 17-Mar-2018 05:00 (03-16 @ 11:27)  (ADM OVERRIDE):   Qty Removed: 1 each  Route - Dose Given <see task> (03-16 @ 11:48)    Assessment & Opinion:   Transient Ischemic Attack.  Brain CT: Acute holohemispheric right subdural hematoma measuring 7 mm in maximal depth with 3 mm of leftward midline shift. Small foci of subarachnoid hemorrhage within the frontal lobes as well as trace left frontal subdural hematoma.     Recommendations:  Brain MRI.  Carotid doppler.  Echocardiogram.  EEG.   PT/OT.  DVT prophylaxis as ordered.  Medications:  Continue current medications

## 2018-03-16 NOTE — H&P ADULT - NSHPREVIEWOFSYSTEMS_GEN_ALL_CORE
CONSTITUTIONAL: No fever, weight loss, or fatigue  EYES: No eye pain, visual disturbances, or discharge  ENMT:  No difficulty hearing, tinnitus, vertigo; No sinus or throat pain  NECK: No pain or stiffness  BREASTS: No pain, masses, or nipple discharge  RESPIRATORY: No cough, wheezing, chills or hemoptysis; No shortness of breath  CARDIOVASCULAR: No chest pain, palpitations, dizziness, or leg swelling  GASTROINTESTINAL: No abdominal or epigastric pain. No nausea, vomiting, or hematemesis; No diarrhea or constipation. No melena or hematochezia.  GENITOURINARY: No dysuria, frequency, hematuria, or incontinence  NEUROLOGICAL: + headaches,  ++memory loss, ++  loss of strength,     No numbness, or tremors  SKIN: No itching, burning, rashes, or lesions   LYMPH NODES: No enlarged glands  ENDOCRINE: No heat or cold intolerance; No hair loss  MUSCULOSKELETAL: No joint pain or swelling; No muscle, back, or extremity pain  PSYCHIATRIC: No depression, anxiety, mood swings, or difficulty sleeping  HEME/LYMPH: No easy bruising, or bleeding gums  ALLERY AND IMMUNOLOGIC: No hives or eczema

## 2018-03-16 NOTE — ED PROVIDER NOTE - OBJECTIVE STATEMENT
89 yo F with dizziness.  Presents s/p fall at home.  Pt. complains of headache after the fall.   ROS: negative for fever, cough, chest pain, shortness of breath, abd pain, nausea, vomiting, diarrhea, rash, paresthesia, and weakness.   PMH: negative; Meds: Denies; SH: Denies smoking/drinking/drug use 89 yo F with dizziness.  Presents s/p fall at home.  Pt. complains of headache after the fall.   Used [employer]  to speak with patient.  She says she's doesn't recall exactly what happened.  No other complaints.  No other traumatic injury.    ROS: negative for fever, cough, chest pain, shortness of breath, abd pain, nausea, vomiting, diarrhea, rash, paresthesia, and weakness.   PMH: negative; Meds: Denies; SH: Denies smoking/drinking/drug use 89 yo F with dizziness.  Presents s/p fall at home.  Pt. complains of headache and dizziness after the fall.     Used [employer]  to speak with patient.  She says she's doesn't recall exactly what happened.  No other complaints.  No other traumatic injury.      ROS: negative for fever, cough, chest pain, shortness of breath, abd pain, nausea, vomiting, diarrhea, rash, paresthesia, and weakness.   PMH: gerd; Meds: See EMR; SH: Denies smoking/drinking/drug use

## 2018-03-16 NOTE — ED PROVIDER NOTE - PROGRESS NOTE DETAILS
radiology called, pt. has R sided subdural hematoma  will call [facility] center called transfer line, spoke with Dr. Sim, neuroSx at [facility]  He says don't transfer unless acute neuro deficit develops, says case is worth of outpt. f/u rather than admission  given pt.'s advanced age and subacute/acute competent of bleed (imaging) will admit to tele for repeat CT and neuro eval in house

## 2018-03-16 NOTE — H&P ADULT - NSHPPHYSICALEXAM_GEN_ALL_CORE
PHYSICAL EXAM:  GENERAL: NAD, well-groomed, well-developed  HEAD:  Atraumatic, Normocephalic  EYES: EOMI, PERRLA, conjunctiva and sclera clear  ENMT: No tonsillar erythema, exudates, or enlargement; Moist mucous membranes, No lesions  NECK: Supple, No JVD, Normal thyroid  NERVOUS SYSTEM:  Alert & Oriented X3; Motor Strength 5/5 B/L upper and lower extremities; DTRs 2+ intact and symmetric  CHEST/LUNG: Clear bilaterally; No rales, rhonchi, wheezing, or rubs  HEART: Regular rate and rhythm; No murmurs, rubs, or gallops  ABDOMEN: Soft, Nontender, Nondistended; Bowel sounds present  EXTREMITIES:  2+ Peripheral Pulses, No clubbing, cyanosis, or edema  LYMPH: No lymphadenopathy noted  SKIN: No rashes or lesions PHYSICAL EXAM:  GENERAL: NAD, well-groomed, well-developed  HEAD:  Tenderness R parietal   EYES: EOMI, PERRLA, conjunctiva and sclera clear  ENMT: No tonsillar erythema, exudates, or enlargement; Moist mucous membranes, No lesions  NECK: Supple, No JVD, Normal thyroid  NERVOUS SYSTEM:  Alert & Oriented X3; Motor Strength 5/5 B/L upper and lower extremities; DTRs 2+ intact and symmetric  CHEST/LUNG: Clear bilaterally; No rales, rhonchi, wheezing, or rubs  HEART: Regular rate and rhythm; No murmurs, rubs, or gallops  ABDOMEN: Soft, Nontender, Nondistended; Bowel sounds present  EXTREMITIES:  2+ Peripheral Pulses, No clubbing, cyanosis, or edema  LYMPH: No lymphadenopathy noted  SKIN: No rashes or lesions

## 2018-03-16 NOTE — ED ADULT NURSE NOTE - OBJECTIVE STATEMENT
Patient alert, on phone with , stating in Guinean that she dose not remember what happened, only that she tried to get up and she fell and hit her head.

## 2018-03-16 NOTE — H&P ADULT - NSHPLABSRESULTS_GEN_ALL_CORE
LABS:                        13.8   3.93  )-----------( 192      ( 16 Mar 2018 10:19 )             39.5     03-16    134<L>  |  97  |  11  ----------------------------<  130<H>  3.9   |  31  |  0.60    Ca    8.9      16 Mar 2018 10:19    TPro  8.0  /  Alb  3.9  /  TBili  0.4  /  DBili  x   /  AST  25  /  ALT  21  /  AlkPhos  169<H>  03-16    PT/INR - ( 16 Mar 2018 10:19 )   PT: 10.7 sec;   INR: 0.98 ratio         PTT - ( 16 Mar 2018 10:19 )  PTT:29.4 sec    CAPILLARY BLOOD GLUCOSE        CARDIAC MARKERS ( 16 Mar 2018 12:11 )  <.015 ng/mL / x     / x     / x     / x          < from: CT Head No Cont (03.16.18 @ 11:15) >      EXAM:  CT CERVICAL SPINE                          EXAM:  CT 3D RECONSTRUCT W DAMIÁN                          EXAM:  CT BRAIN                            PROCEDURE DATE:  03/16/2018          INTERPRETATION:  CLINICAL INDICATION: Status post fall with headache and   neck pain    Axial CT images were obtained from the skull vertex to the level of T3   without contrast.  Coronal and sagittal reformations were obtained.  No   prior studies are available for comparison. 3-D reformats of the cervical   spine were performed.    FINDINGS:    Head CT:  Self breast.  There is an acute holohemispheric right subdural hematoma measuring 7 mm   in maximal depth and resulting in 3 mm of leftward midline shift and mild   compression of the right lateral ventricle. There are a few small areas   of subarachnoid hemorrhage in the high frontal lobes bilaterally as well   as a trace subdural hematoma in the left frontal region.    There are mild small vessel white matter ischemic changes. The basal   cisterns are not effaced.    The paranasal sinuses and mastoids are well aerated, aside from a small   polyp versus retention cyst within the left frontal sinus. There is no   displaced calvarial fracture. There is small extra-axial soft tissue   swelling in the right parietal region.    CT C-spine:    There is no acute fracture or traumatic subluxation.    There are multilevel degenerative changes characterized by disc   osteophyte complexes and facet and uncinate hypertrophy.  This results in   mild canal stenosis and neural foraminal narrowing.    There is no prevertebral soft tissue swelling. Thyroid gland is within   normal limits. There is mild biapical pleural thickening.    IMPRESSION:  Head CT: Acute holohemispheric right subdural hematoma measuring 7 mm in   maximal depth with 3 mm of leftward midline shift. Small foci of   subarachnoid hemorrhage within the frontal lobes as well as trace left   frontal subdural hematoma. Critical findings were discussed with Dr. Vásquez   at 10:00 am on 3/16/2018.    Cervical spine CT: No acute fracture or traumatic malalignment.   Multilevel degenerative changes.          < end of copied text >

## 2018-03-16 NOTE — H&P ADULT - HISTORY OF PRESENT ILLNESS
89yo,  with h/o HTN,  found on found by daughter today, heard an noise. Patient was unresponsive initially and could not recall event.  Patient notes dizziness and headache.  Denies CP, SOB, cough, palpitation, n/v/d, fever, chills, weakness, abdominal pain.    In ER CT findings R Subdural.

## 2018-03-16 NOTE — ED PROVIDER NOTE - MEDICAL DECISION MAKING DETAILS
89 yo F s/p fall with head trauma  -basic labs, type and screen, coags, CT brain/cs, xray chest and pelvis r/o traummatic injury, trop, ekg, cardiac monitor, iv line, tylenol for pain, also UA/Cx  -f/u results, reeval

## 2018-03-17 LAB
ALBUMIN SERPL ELPH-MCNC: 3.4 G/DL — SIGNIFICANT CHANGE UP (ref 3.3–5)
ALP SERPL-CCNC: 123 U/L — HIGH (ref 40–120)
ALT FLD-CCNC: 16 U/L — SIGNIFICANT CHANGE UP (ref 12–78)
ANION GAP SERPL CALC-SCNC: 9 MMOL/L — SIGNIFICANT CHANGE UP (ref 5–17)
APPEARANCE UR: CLEAR — SIGNIFICANT CHANGE UP
AST SERPL-CCNC: 19 U/L — SIGNIFICANT CHANGE UP (ref 15–37)
BILIRUB SERPL-MCNC: 0.7 MG/DL — SIGNIFICANT CHANGE UP (ref 0.2–1.2)
BILIRUB UR-MCNC: NEGATIVE — SIGNIFICANT CHANGE UP
BUN SERPL-MCNC: 16 MG/DL — SIGNIFICANT CHANGE UP (ref 7–23)
CALCIUM SERPL-MCNC: 8.6 MG/DL — SIGNIFICANT CHANGE UP (ref 8.5–10.1)
CHLORIDE SERPL-SCNC: 96 MMOL/L — SIGNIFICANT CHANGE UP (ref 96–108)
CK SERPL-CCNC: 112 U/L — SIGNIFICANT CHANGE UP (ref 26–192)
CO2 SERPL-SCNC: 27 MMOL/L — SIGNIFICANT CHANGE UP (ref 22–31)
COLOR SPEC: YELLOW — SIGNIFICANT CHANGE UP
CREAT SERPL-MCNC: 0.59 MG/DL — SIGNIFICANT CHANGE UP (ref 0.5–1.3)
DIFF PNL FLD: ABNORMAL
GLUCOSE SERPL-MCNC: 106 MG/DL — HIGH (ref 70–99)
GLUCOSE UR QL: NEGATIVE MG/DL — SIGNIFICANT CHANGE UP
HCT VFR BLD CALC: 36.1 % — SIGNIFICANT CHANGE UP (ref 34.5–45)
HGB BLD-MCNC: 12.5 G/DL — SIGNIFICANT CHANGE UP (ref 11.5–15.5)
KETONES UR-MCNC: ABNORMAL
LEUKOCYTE ESTERASE UR-ACNC: ABNORMAL
MCHC RBC-ENTMCNC: 32 PG — SIGNIFICANT CHANGE UP (ref 27–34)
MCHC RBC-ENTMCNC: 34.6 GM/DL — SIGNIFICANT CHANGE UP (ref 32–36)
MCV RBC AUTO: 92.3 FL — SIGNIFICANT CHANGE UP (ref 80–100)
NITRITE UR-MCNC: NEGATIVE — SIGNIFICANT CHANGE UP
NRBC # BLD: 0 /100 WBCS — SIGNIFICANT CHANGE UP (ref 0–0)
PH UR: 7 — SIGNIFICANT CHANGE UP (ref 5–8)
PLATELET # BLD AUTO: 177 K/UL — SIGNIFICANT CHANGE UP (ref 150–400)
POTASSIUM SERPL-MCNC: 3.6 MMOL/L — SIGNIFICANT CHANGE UP (ref 3.5–5.3)
POTASSIUM SERPL-SCNC: 3.6 MMOL/L — SIGNIFICANT CHANGE UP (ref 3.5–5.3)
PROT SERPL-MCNC: 6.9 GM/DL — SIGNIFICANT CHANGE UP (ref 6–8.3)
PROT UR-MCNC: 15 MG/DL
RBC # BLD: 3.91 M/UL — SIGNIFICANT CHANGE UP (ref 3.8–5.2)
RBC # FLD: 12.2 % — SIGNIFICANT CHANGE UP (ref 10.3–14.5)
SODIUM SERPL-SCNC: 132 MMOL/L — LOW (ref 135–145)
SP GR SPEC: 1.01 — SIGNIFICANT CHANGE UP (ref 1.01–1.02)
TROPONIN I SERPL-MCNC: <.015 NG/ML — SIGNIFICANT CHANGE UP (ref 0.01–0.04)
UROBILINOGEN FLD QL: NEGATIVE MG/DL — SIGNIFICANT CHANGE UP
WBC # BLD: 8.99 K/UL — SIGNIFICANT CHANGE UP (ref 3.8–10.5)
WBC # FLD AUTO: 8.99 K/UL — SIGNIFICANT CHANGE UP (ref 3.8–10.5)

## 2018-03-17 PROCEDURE — 93880 EXTRACRANIAL BILAT STUDY: CPT | Mod: 26

## 2018-03-17 PROCEDURE — 71045 X-RAY EXAM CHEST 1 VIEW: CPT | Mod: 26

## 2018-03-17 PROCEDURE — 70551 MRI BRAIN STEM W/O DYE: CPT | Mod: 26

## 2018-03-17 PROCEDURE — 70450 CT HEAD/BRAIN W/O DYE: CPT | Mod: 26

## 2018-03-17 RX ORDER — ASPIRIN/CALCIUM CARB/MAGNESIUM 324 MG
325 TABLET ORAL DAILY
Qty: 0 | Refills: 0 | Status: DISCONTINUED | OUTPATIENT
Start: 2018-03-17 | End: 2018-03-17

## 2018-03-17 RX ADMIN — Medication 25 MILLIGRAM(S): at 17:43

## 2018-03-17 RX ADMIN — Medication 100 MILLIGRAM(S): at 13:44

## 2018-03-17 RX ADMIN — Medication 100 MILLIGRAM(S): at 05:13

## 2018-03-17 RX ADMIN — Medication 650 MILLIGRAM(S): at 05:57

## 2018-03-17 RX ADMIN — Medication 100 MILLIGRAM(S): at 21:49

## 2018-03-17 RX ADMIN — Medication 650 MILLIGRAM(S): at 13:43

## 2018-03-17 RX ADMIN — Medication 25 MILLIGRAM(S): at 05:13

## 2018-03-17 RX ADMIN — Medication 650 MILLIGRAM(S): at 14:45

## 2018-03-17 RX ADMIN — Medication 650 MILLIGRAM(S): at 05:10

## 2018-03-17 RX ADMIN — PANTOPRAZOLE SODIUM 40 MILLIGRAM(S): 20 TABLET, DELAYED RELEASE ORAL at 05:13

## 2018-03-17 NOTE — PROGRESS NOTE ADULT - PROBLEM SELECTOR PLAN 1
Neuro consult noted;    Assessment & Opinion:   Transient Ischemic Attack.  Brain CT: Acute holohemispheric right subdural hematoma measuring 7 mm in maximal depth with 3 mm of leftward midline shift. Small foci of subarachnoid hemorrhage within the frontal lobes as well as trace left frontal subdural hematoma.     Recommendations:  Brain MRI.  Carotid doppler.  Echocardiogram.  EEG.   PT/OT.  DVT prophylaxis as ordered.  Medications:  Continue current medications  Neuro check and monitoring  MRI noted.

## 2018-03-17 NOTE — PROGRESS NOTE ADULT - SUBJECTIVE AND OBJECTIVE BOX
Patient is a 89y old  Female who presents with a chief complaint of Found on floor (16 Mar 2018 14:38)      SUBJECTIVE/OBJECTIVE:    Notes slight HA.  Eating.  Family at bedside.    MEDICATIONS  (STANDING):  aspirin 325 milliGRAM(s) Oral daily  docusate sodium Liquid 100 milliGRAM(s) Oral three times a day  metoprolol     tartrate 25 milliGRAM(s) Oral two times a day  pantoprazole    Tablet 40 milliGRAM(s) Oral before breakfast    MEDICATIONS  (PRN):  acetaminophen    Suspension. 650 milliGRAM(s) Oral every 6 hours PRN Mild Pain (1 - 3)  ondansetron Injectable 4 milliGRAM(s) IV Push every 6 hours PRN Nausea      Allergies    No Known Allergies    Intolerances          Vital Signs Last 24 Hrs  T(C): 36.6 (17 Mar 2018 12:47), Max: 36.7 (16 Mar 2018 18:26)  T(F): 97.8 (17 Mar 2018 12:47), Max: 98 (16 Mar 2018 18:26)  HR: 78 (17 Mar 2018 12:47) (66 - 78)  BP: 147/61 (17 Mar 2018 12:47) (121/58 - 169/60)  BP(mean): --  RR: 17 (17 Mar 2018 12:47) (17 - 18)  SpO2: 97% (17 Mar 2018 12:47) (96% - 99%)    PHYSICAL EXAM:  GENERAL: NAD, well-groomed, well-developed  HEAD:  Atraumatic, Normocephalic  EYES: EOMI, PERRLA, conjunctiva and sclera clear  ENMT: No tonsillar erythema, exudates, or enlargement; Moist mucous membranes, No lesions  NECK: Supple, No JVD, Normal thyroid  NERVOUS SYSTEM:  Alert & Oriented X3; Motor Strength 5/5 B/L upper and lower extremities; DTRs 2+ intact and symmetric  CHEST/LUNG: Clear bilaterally; No rales, rhonchi, wheezing, or rubs  HEART: Regular rate and rhythm; No murmurs, rubs, or gallops  ABDOMEN: Soft, Nontender, Nondistended; Bowel sounds present  EXTREMITIES:  2+ Peripheral Pulses, No clubbing, cyanosis, or edema  LYMPH: No lymphadenopathy noted  SKIN: No rashes or lesions    LABS:                        12.5   8.99  )-----------( 177      ( 17 Mar 2018 08:19 )             36.1         132<L>  |  96  |  16  ----------------------------<  106<H>  3.6   |  27  |  0.59    Ca    8.6      17 Mar 2018 08:19    TPro  6.9  /  Alb  3.4  /  TBili  0.7  /  DBili  x   /  AST  19  /  ALT  16  /  AlkPhos  123<H>      PT/INR - ( 16 Mar 2018 10:19 )   PT: 10.7 sec;   INR: 0.98 ratio         PTT - ( 16 Mar 2018 10:19 )  PTT:29.4 sec  Urinalysis Basic - ( 17 Mar 2018 12:55 )    Color: Yellow / Appearance: Clear / S.010 / pH: x  Gluc: x / Ketone: Trace  / Bili: Negative / Urobili: Negative mg/dL   Blood: x / Protein: 15 mg/dL / Nitrite: Negative   Leuk Esterase: Small / RBC: 3-5 /HPF / WBC 3-5   Sq Epi: x / Non Sq Epi: Occasional / Bacteria: x      CAPILLARY BLOOD GLUCOSE        CARDIAC MARKERS ( 17 Mar 2018 08:19 )  <.015 ng/mL / x     / 112 U/L / x     / x      CARDIAC MARKERS ( 16 Mar 2018 23:23 )  <.015 ng/mL / x     / 89 U/L / x     / x      CARDIAC MARKERS ( 16 Mar 2018 15:36 )  <.015 ng/mL / x     / 79 U/L / x     / x      CARDIAC MARKERS ( 16 Mar 2018 12:11 )  <.015 ng/mL / x     / x     / x     / x            RADIOLOGY & ADDITIONAL TESTS:    < from: MR Head No Cont (18 @ 09:45) >  EXAM:  MR BRAIN                            PROCEDURE DATE:  2018          INTERPRETATION:  MRI brain without contrast    History intracranial hemorrhage    Comparison CT performed the same day    An extensive right hemispheric subdural hematoma is again noted as   described by immediately preceding CT report with associated midline   shift and early subfalcine herniation. There are tiny foci of deep white   matter chronic microhemorrhages and mild chronic microvascular ischemic   change in the periventricular white matter. There is no evidence of acute   infarct or cortical edema or hydrocephalus. The orbital and sellar   contents and cerebellar tonsils are within normal limits.    IMPRESSION:  Moderately large right hemispheric subdural hematoma as described on the   immediately preceding CT report  Chronic microvascular ischemic change with microhemorrhages implying   chronic hypertensive angiopathy    < end of copied text >      < from: US Duplex Carotid Arteries Complete, Bilateral (18 @ 12:14) >  EXAM:  US DPLX CAROTIDS COMPL BI                            PROCEDURE DATE:  2018          INTERPRETATION:  CLINICAL INFORMATION: Syncope, subdural hematoma.    TECHNIQUE: Carotid duplex examination using grayscale B mode, color flow,   and spectral Doppler.  COMPARISON: None.    Findings:    RIGHT --    Common carotid artery: Mild heterogeneous plaque. Peak systolic velocity   82 cm/sec.    Internal carotid artery: Mild heterogeneous plaque. Peak systolic   velocity 82 cm/sec. End-diastolic velocity 15 cm/sec.    ICA/CCA ratio: 1.0    External carotid artery: Peak systolic velocity 61 cm/sec.    < end of copied text >      Consultant(s) Notes Reviewed:  [ ] YES  [ ] NO

## 2018-03-18 ENCOUNTER — INPATIENT (INPATIENT)
Facility: HOSPITAL | Age: 83
LOS: 5 days | Discharge: HOME | DRG: 85 | End: 2018-03-24
Attending: NEUROLOGICAL SURGERY | Admitting: NEUROLOGICAL SURGERY
Payer: MEDICARE

## 2018-03-18 VITALS
TEMPERATURE: 98 F | SYSTOLIC BLOOD PRESSURE: 170 MMHG | DIASTOLIC BLOOD PRESSURE: 67 MMHG | OXYGEN SATURATION: 94 % | HEART RATE: 74 BPM | RESPIRATION RATE: 18 BRPM

## 2018-03-18 VITALS
OXYGEN SATURATION: 96 % | TEMPERATURE: 100 F | DIASTOLIC BLOOD PRESSURE: 63 MMHG | HEART RATE: 78 BPM | SYSTOLIC BLOOD PRESSURE: 155 MMHG | RESPIRATION RATE: 17 BRPM

## 2018-03-18 DIAGNOSIS — I62.00 NONTRAUMATIC SUBDURAL HEMORRHAGE, UNSPECIFIED: ICD-10-CM

## 2018-03-18 DIAGNOSIS — Z90.710 ACQUIRED ABSENCE OF BOTH CERVIX AND UTERUS: Chronic | ICD-10-CM

## 2018-03-18 LAB
ALBUMIN SERPL ELPH-MCNC: 4.6 G/DL — SIGNIFICANT CHANGE UP (ref 3.3–5)
ALP SERPL-CCNC: 124 U/L — HIGH (ref 40–120)
ALT FLD-CCNC: 14 U/L RC — SIGNIFICANT CHANGE UP (ref 10–45)
ANION GAP SERPL CALC-SCNC: 13 MMOL/L — SIGNIFICANT CHANGE UP (ref 5–17)
ANION GAP SERPL CALC-SCNC: 8 MMOL/L — SIGNIFICANT CHANGE UP (ref 5–17)
APTT BLD: 28.6 SEC — SIGNIFICANT CHANGE UP (ref 27.5–37.4)
AST SERPL-CCNC: 21 U/L — SIGNIFICANT CHANGE UP (ref 10–40)
BASOPHILS # BLD AUTO: 0.1 K/UL — SIGNIFICANT CHANGE UP (ref 0–0.2)
BASOPHILS NFR BLD AUTO: 1.1 % — SIGNIFICANT CHANGE UP (ref 0–2)
BILIRUB SERPL-MCNC: 0.6 MG/DL — SIGNIFICANT CHANGE UP (ref 0.2–1.2)
BLD GP AB SCN SERPL QL: NEGATIVE — SIGNIFICANT CHANGE UP
BUN SERPL-MCNC: 13 MG/DL — SIGNIFICANT CHANGE UP (ref 7–23)
BUN SERPL-MCNC: 14 MG/DL — SIGNIFICANT CHANGE UP (ref 7–23)
CALCIUM SERPL-MCNC: 9 MG/DL — SIGNIFICANT CHANGE UP (ref 8.5–10.1)
CALCIUM SERPL-MCNC: 9.8 MG/DL — SIGNIFICANT CHANGE UP (ref 8.4–10.5)
CHLORIDE SERPL-SCNC: 90 MMOL/L — LOW (ref 96–108)
CHLORIDE SERPL-SCNC: 95 MMOL/L — LOW (ref 96–108)
CO2 SERPL-SCNC: 29 MMOL/L — SIGNIFICANT CHANGE UP (ref 22–31)
CO2 SERPL-SCNC: 29 MMOL/L — SIGNIFICANT CHANGE UP (ref 22–31)
CREAT SERPL-MCNC: 0.47 MG/DL — LOW (ref 0.5–1.3)
CREAT SERPL-MCNC: 0.52 MG/DL — SIGNIFICANT CHANGE UP (ref 0.5–1.3)
CULTURE RESULTS: SIGNIFICANT CHANGE UP
EOSINOPHIL # BLD AUTO: 0 K/UL — SIGNIFICANT CHANGE UP (ref 0–0.5)
EOSINOPHIL NFR BLD AUTO: 0 % — SIGNIFICANT CHANGE UP (ref 0–6)
GLUCOSE SERPL-MCNC: 128 MG/DL — HIGH (ref 70–99)
GLUCOSE SERPL-MCNC: 140 MG/DL — HIGH (ref 70–99)
HCT VFR BLD CALC: 37.5 % — SIGNIFICANT CHANGE UP (ref 34.5–45)
HCT VFR BLD CALC: 42.2 % — SIGNIFICANT CHANGE UP (ref 34.5–45)
HGB BLD-MCNC: 13 G/DL — SIGNIFICANT CHANGE UP (ref 11.5–15.5)
HGB BLD-MCNC: 14.5 G/DL — SIGNIFICANT CHANGE UP (ref 11.5–15.5)
INR BLD: 0.96 RATIO — SIGNIFICANT CHANGE UP (ref 0.88–1.16)
LYMPHOCYTES # BLD AUTO: 0.8 K/UL — LOW (ref 1–3.3)
LYMPHOCYTES # BLD AUTO: 9.2 % — LOW (ref 13–44)
MCHC RBC-ENTMCNC: 31.6 PG — SIGNIFICANT CHANGE UP (ref 27–34)
MCHC RBC-ENTMCNC: 32.9 PG — SIGNIFICANT CHANGE UP (ref 27–34)
MCHC RBC-ENTMCNC: 34.4 GM/DL — SIGNIFICANT CHANGE UP (ref 32–36)
MCHC RBC-ENTMCNC: 34.7 GM/DL — SIGNIFICANT CHANGE UP (ref 32–36)
MCV RBC AUTO: 91.2 FL — SIGNIFICANT CHANGE UP (ref 80–100)
MCV RBC AUTO: 95.8 FL — SIGNIFICANT CHANGE UP (ref 80–100)
MONOCYTES # BLD AUTO: 0.4 K/UL — SIGNIFICANT CHANGE UP (ref 0–0.9)
MONOCYTES NFR BLD AUTO: 5.4 % — SIGNIFICANT CHANGE UP (ref 2–14)
NEUTROPHILS # BLD AUTO: 6.9 K/UL — SIGNIFICANT CHANGE UP (ref 1.8–7.4)
NEUTROPHILS NFR BLD AUTO: 84.2 % — HIGH (ref 43–77)
NRBC # BLD: 0 /100 WBCS — SIGNIFICANT CHANGE UP (ref 0–0)
PLATELET # BLD AUTO: 188 K/UL — SIGNIFICANT CHANGE UP (ref 150–400)
PLATELET # BLD AUTO: 201 K/UL — SIGNIFICANT CHANGE UP (ref 150–400)
POTASSIUM SERPL-MCNC: 3.4 MMOL/L — LOW (ref 3.5–5.3)
POTASSIUM SERPL-MCNC: 3.9 MMOL/L — SIGNIFICANT CHANGE UP (ref 3.5–5.3)
POTASSIUM SERPL-SCNC: 3.4 MMOL/L — LOW (ref 3.5–5.3)
POTASSIUM SERPL-SCNC: 3.9 MMOL/L — SIGNIFICANT CHANGE UP (ref 3.5–5.3)
PROT SERPL-MCNC: 8.6 G/DL — HIGH (ref 6–8.3)
PROTHROM AB SERPL-ACNC: 10.4 SEC — SIGNIFICANT CHANGE UP (ref 9.8–12.7)
RBC # BLD: 4.11 M/UL — SIGNIFICANT CHANGE UP (ref 3.8–5.2)
RBC # BLD: 4.41 M/UL — SIGNIFICANT CHANGE UP (ref 3.8–5.2)
RBC # FLD: 11.7 % — SIGNIFICANT CHANGE UP (ref 10.3–14.5)
RBC # FLD: 12.1 % — SIGNIFICANT CHANGE UP (ref 10.3–14.5)
RH IG SCN BLD-IMP: POSITIVE — SIGNIFICANT CHANGE UP
SODIUM SERPL-SCNC: 132 MMOL/L — LOW (ref 135–145)
SODIUM SERPL-SCNC: 132 MMOL/L — LOW (ref 135–145)
SPECIMEN SOURCE: SIGNIFICANT CHANGE UP
WBC # BLD: 8.2 K/UL — SIGNIFICANT CHANGE UP (ref 3.8–10.5)
WBC # BLD: 8.43 K/UL — SIGNIFICANT CHANGE UP (ref 3.8–10.5)
WBC # FLD AUTO: 8.2 K/UL — SIGNIFICANT CHANGE UP (ref 3.8–10.5)
WBC # FLD AUTO: 8.43 K/UL — SIGNIFICANT CHANGE UP (ref 3.8–10.5)

## 2018-03-18 RX ORDER — DEXAMETHASONE 0.5 MG/5ML
4 ELIXIR ORAL EVERY 4 HOURS
Qty: 0 | Refills: 0 | Status: DISCONTINUED | OUTPATIENT
Start: 2018-03-18 | End: 2018-03-21

## 2018-03-18 RX ORDER — HYDRALAZINE HCL 50 MG
5 TABLET ORAL ONCE
Qty: 0 | Refills: 0 | Status: COMPLETED | OUTPATIENT
Start: 2018-03-18 | End: 2018-03-18

## 2018-03-18 RX ORDER — DOCUSATE SODIUM 100 MG
100 CAPSULE ORAL THREE TIMES A DAY
Qty: 0 | Refills: 0 | Status: DISCONTINUED | OUTPATIENT
Start: 2018-03-18 | End: 2018-03-19

## 2018-03-18 RX ORDER — ACETAMINOPHEN 500 MG
650 TABLET ORAL EVERY 6 HOURS
Qty: 0 | Refills: 0 | Status: DISCONTINUED | OUTPATIENT
Start: 2018-03-18 | End: 2018-03-19

## 2018-03-18 RX ORDER — HYDRALAZINE HCL 50 MG
5 TABLET ORAL EVERY 4 HOURS
Qty: 0 | Refills: 0 | Status: DISCONTINUED | OUTPATIENT
Start: 2018-03-18 | End: 2018-03-19

## 2018-03-18 RX ORDER — ACETAMINOPHEN 500 MG
1000 TABLET ORAL ONCE
Qty: 0 | Refills: 0 | Status: COMPLETED | OUTPATIENT
Start: 2018-03-18 | End: 2018-03-18

## 2018-03-18 RX ORDER — SENNA PLUS 8.6 MG/1
2 TABLET ORAL AT BEDTIME
Qty: 0 | Refills: 0 | Status: DISCONTINUED | OUTPATIENT
Start: 2018-03-18 | End: 2018-03-19

## 2018-03-18 RX ORDER — METOPROLOL TARTRATE 50 MG
25 TABLET ORAL
Qty: 0 | Refills: 0 | Status: DISCONTINUED | OUTPATIENT
Start: 2018-03-18 | End: 2018-03-18

## 2018-03-18 RX ORDER — DEXTROSE MONOHYDRATE, SODIUM CHLORIDE, AND POTASSIUM CHLORIDE 50; .745; 4.5 G/1000ML; G/1000ML; G/1000ML
1000 INJECTION, SOLUTION INTRAVENOUS
Qty: 0 | Refills: 0 | Status: DISCONTINUED | OUTPATIENT
Start: 2018-03-18 | End: 2018-03-19

## 2018-03-18 RX ORDER — AMLODIPINE BESYLATE 2.5 MG/1
10 TABLET ORAL DAILY
Qty: 0 | Refills: 0 | Status: DISCONTINUED | OUTPATIENT
Start: 2018-03-18 | End: 2018-03-18

## 2018-03-18 RX ORDER — ALBUTEROL 90 UG/1
1 AEROSOL, METERED ORAL EVERY 4 HOURS
Qty: 0 | Refills: 0 | Status: DISCONTINUED | OUTPATIENT
Start: 2018-03-18 | End: 2018-03-24

## 2018-03-18 RX ADMIN — Medication 4 MILLIGRAM(S): at 16:02

## 2018-03-18 RX ADMIN — Medication 1000 MILLIGRAM(S): at 16:50

## 2018-03-18 RX ADMIN — PANTOPRAZOLE SODIUM 40 MILLIGRAM(S): 20 TABLET, DELAYED RELEASE ORAL at 06:24

## 2018-03-18 RX ADMIN — Medication 100 MILLIGRAM(S): at 06:24

## 2018-03-18 RX ADMIN — Medication 4 MILLIGRAM(S): at 21:03

## 2018-03-18 RX ADMIN — Medication 400 MILLIGRAM(S): at 16:00

## 2018-03-18 RX ADMIN — Medication 5 MILLIGRAM(S): at 16:01

## 2018-03-18 RX ADMIN — Medication 25 MILLIGRAM(S): at 06:24

## 2018-03-18 RX ADMIN — DEXTROSE MONOHYDRATE, SODIUM CHLORIDE, AND POTASSIUM CHLORIDE 75 MILLILITER(S): 50; .745; 4.5 INJECTION, SOLUTION INTRAVENOUS at 16:02

## 2018-03-18 NOTE — PROGRESS NOTE ADULT - SUBJECTIVE AND OBJECTIVE BOX
INTERVAL HPI/OVERNIGHT EVENTS:  Brain MRI reviewed:  Moderately large right hemispheric subdural hematoma as described on the immediately preceding CT report  Chronic microvascular ischemic change with microhemorrhages implying chronic hypertensive angiopathy  Subjective Complaints:  Right sided headaches.  Family at bedside and spoke to a daughter, Estela.   Called Dr. Hastings and apprised her of follow up neurosurgical consult.  Neurosurgeon was called from ER when patient initially arrived and the plan was medical care considering her age of 89.  Now, patient though awake  appears lethargic with left sided weakness.  Recalled Dr. Sim and case discussed for reevaluation and transfer.  Family made aware.    NEUROLOGICAL EXAM (Pertinent):  Vital Signs Last 24 Hrs  T(C): 37.3 (18 Mar 2018 05:40), Max: 37.3 (18 Mar 2018 05:40)  T(F): 99.1 (18 Mar 2018 05:40), Max: 99.1 (18 Mar 2018 05:40)  HR: 77 (18 Mar 2018 05:40) (70 - 78)  BP: 167/62 (18 Mar 2018 05:40) (135/54 - 167/62)  BP(mean): --  RR: 18 (18 Mar 2018 05:40) (17 - 18)  SpO2: 96% (18 Mar 2018 05:40) (95% - 97%)    MEDICATIONS  (STANDING):  docusate sodium Liquid 100 milliGRAM(s) Oral three times a day  metoprolol     tartrate 25 milliGRAM(s) Oral two times a day  pantoprazole    Tablet 40 milliGRAM(s) Oral before breakfast    MEDICATIONS  (PRN):  acetaminophen    Suspension. 650 milliGRAM(s) Oral every 6 hours PRN Mild Pain (1 - 3)  ondansetron Injectable 4 milliGRAM(s) IV Push every 6 hours PRN Nausea    LABS:                        13.0   8.43  )-----------( 188      ( 18 Mar 2018 07:40 )             37.5     03-18    132<L>  |  95<L>  |  13  ----------------------------<  140<H>  3.4<L>   |  29  |  0.47<L>    Ca    9.0      18 Mar 2018 07:40    TPro  6.9  /  Alb  3.4  /  TBili  0.7  /  DBili  x   /  AST  19  /  ALT  16  /  AlkPhos  123<H>  03-17      Urinalysis Basic - ( 17 Mar 2018 12:55 )    Color: Yellow / Appearance: Clear / S.010 / pH: x  Gluc: x / Ketone: Trace  / Bili: Negative / Urobili: Negative mg/dL   Blood: x / Protein: 15 mg/dL / Nitrite: Negative   Leuk Esterase: Small / RBC: 3-5 /HPF / WBC 3-5   Sq Epi: x / Non Sq Epi: Occasional / Bacteria: x

## 2018-03-18 NOTE — H&P ADULT - ASSESSMENT
This is a 89F s/p fall two days ago w/ a large acute R holohemispheric SDH w/ 7mm MLS. She is maintaining her airway, but is lethargic and hemiparetic on exam. Per family, they would be amenable to surgical intervention if offered.    q4 neuro checks  pain control  will discuss case w/ Dr. Sim

## 2018-03-18 NOTE — H&P ADULT - HISTORY OF PRESENT ILLNESS
Patient is a 89 yo F w/ no medical hx, not on any blood thinners, who fell two days ago while trying to put on her pants and has a 1.3 cm R holohemispheric acute SDH w/ 7mm MLS on CTH done at OSH. Per granddaughter, patient was wide awake and alert until this morning, when she was noted to be lethargic and severely dysarthric. At baseline, she lives w/ her daughter, and is capable of performing all ADLs w/ help of an aide. Patient has previously expressed a DNR status w/ her family. On exam, she is lethargic, but Ox2, w/ L hemiparesis.

## 2018-03-18 NOTE — H&P ADULT - NSHPPHYSICALEXAM_GEN_ALL_CORE
Lethargic, Ox2, EO to voice, FC  PERRL; R gaze preference  FC/purposeful on the R  withdraws on the L  SILT throughout

## 2018-03-19 DIAGNOSIS — G93.40 ENCEPHALOPATHY, UNSPECIFIED: ICD-10-CM

## 2018-03-19 DIAGNOSIS — Z51.5 ENCOUNTER FOR PALLIATIVE CARE: ICD-10-CM

## 2018-03-19 DIAGNOSIS — R13.10 DYSPHAGIA, UNSPECIFIED: ICD-10-CM

## 2018-03-19 DIAGNOSIS — I62.00 NONTRAUMATIC SUBDURAL HEMORRHAGE, UNSPECIFIED: ICD-10-CM

## 2018-03-19 PROCEDURE — 99232 SBSQ HOSP IP/OBS MODERATE 35: CPT

## 2018-03-19 PROCEDURE — 99223 1ST HOSP IP/OBS HIGH 75: CPT

## 2018-03-19 RX ORDER — LEVETIRACETAM 250 MG/1
500 TABLET, FILM COATED ORAL EVERY 12 HOURS
Qty: 0 | Refills: 0 | Status: DISCONTINUED | OUTPATIENT
Start: 2018-03-19 | End: 2018-03-22

## 2018-03-19 RX ORDER — HYDRALAZINE HCL 50 MG
10 TABLET ORAL EVERY 4 HOURS
Qty: 0 | Refills: 0 | Status: DISCONTINUED | OUTPATIENT
Start: 2018-03-19 | End: 2018-03-24

## 2018-03-19 RX ADMIN — Medication 4 MILLIGRAM(S): at 21:14

## 2018-03-19 RX ADMIN — Medication 4 MILLIGRAM(S): at 14:36

## 2018-03-19 RX ADMIN — Medication 4 MILLIGRAM(S): at 05:54

## 2018-03-19 RX ADMIN — Medication 4 MILLIGRAM(S): at 09:25

## 2018-03-19 RX ADMIN — Medication 4 MILLIGRAM(S): at 02:13

## 2018-03-19 RX ADMIN — LEVETIRACETAM 400 MILLIGRAM(S): 250 TABLET, FILM COATED ORAL at 17:59

## 2018-03-19 RX ADMIN — Medication 4 MILLIGRAM(S): at 17:19

## 2018-03-19 NOTE — CONSULT NOTE ADULT - SUBJECTIVE AND OBJECTIVE BOX
HPI:  Patient is a 89 yo F w/ no medical hx, not on any blood thinners, who fell two days ago while trying to put on her pants and has a 1.3 cm R holohemispheric acute SDH w/ 7mm MLS on CTH done at OSH. Per granddaughter, patient was wide awake and alert until this morning, when she was noted to be lethargic and severely dysarthric. At baseline, she lives w/ her daughter, and is capable of performing all ADLs w/ help of an aide. Patient has previously expressed a DNR status w/ her family. On exam, she is lethargic, but Ox2, w/ L hemiparesis. (18 Mar 2018 12:56)      PERTINENT PM/SXH:   No pertinent past medical history    S/P hysterectomy    SOCIAL HISTORY:   Significant other/partner:  [ ] YES  [x ] NO               Children:  [ x] YES  [ ] NO                   Sikh/Spirituality:Mosque  Substance hx:  [ ] YES   [x ] NO                   Tobacco hx:  [ ] YES  [x ] NO                       Alcohol hx: [ ] YES  [x ] NO         Home Opioid hx:  [ ] YES  [x ] NO   Living Situation: [x ] Home  [ ] Long term care  [ ] Rehab [ ] Other    FAMILY HISTORY:  No pertinent family history in first degree relatives    [x ] Family history non-contributory     BASELINE (I)ADLs (prior to admission):  [place]: [ ] total  [ ] moderate [ ] dependent    ADVANCE DIRECTIVES:    DNR   MOLST  [ ] YES [x ] NO                      [ ] Completed  Health Care Proxy [ ] YES  [x ] NO   [ ] Completed  Living Will  [ ] YES [x ] NO             [x ] Surrogate  [ ] HCP  [ ] Guardian:             Estela López                                                      Phone#:    Allergies    No Known Allergies    Intolerances        MEDICATIONS  (STANDING):  dexamethasone  Injectable 4 milliGRAM(s) IV Push every 4 hours  docusate sodium 100 milliGRAM(s) Oral three times a day  sodium chloride 0.9% with potassium chloride 20 mEq/L 1000 milliLiter(s) (75 mL/Hr) IV Continuous <Continuous>    MEDICATIONS  (PRN):  acetaminophen   Tablet 650 milliGRAM(s) Oral every 6 hours PRN For Temp greater than 38 C (100.4 F)  acetaminophen   Tablet. 650 milliGRAM(s) Oral every 6 hours PRN Mild Pain (1 - 3)  ALBUTerol    90 MICROgram(s) HFA Inhaler 1 Puff(s) Inhalation every 4 hours PRN Shortness of Breath and/or Wheezing  hydrALAZINE Injectable 5 milliGRAM(s) IV Push every 4 hours PRN SBP>160  senna 2 Tablet(s) Oral at bedtime PRN Constipation      PRESENT SYMPTOMS:  Source: [ ] Patient   [ ] Family   [ ] Team     Pain:                        [ ] No [ ] Yes             [ ] Mild [ ] Moderate [ ] Severe    Onset -  Location -  Duration -  Character -  Alleviating/Aggravating -  Radiation -  Timing -      Dyspnea:                [ ] No [ ] Yes             [ ] Mild [ ] Moderate [ ] Severe    Anxiety:                  [ ] No [ ] Yes             [ ] Mild [ ] Moderate [ ] Severe    Fatigue:                  [ ] No [ ] Yes             [ ] Mild [ ] Moderate [ ] Severe    Nausea:                  [ ] No [ ] Yes             [ ] Mild [ ] Moderate [ ] Severe    Loss of appetite:   [ ] No [ ] Yes             [ ] Mild [ ] Moderate [ ] Severe    Constipation:        [ ] No [ ] Yes             [ ] Mild [ ] Moderate [ ] Severe    Other Symptoms:  [ ] All other review of systems negative   [x ] Unable to obtain due to poor mentation     Karnofsky Performance Score/Palliative Performance Status Version 2:     20    %    PHYSICAL EXAM:  Vital Signs Last 24 Hrs  T(C): 36.7 (19 Mar 2018 07:40), Max: 38.4 (18 Mar 2018 20:07)  T(F): 98 (19 Mar 2018 07:40), Max: 101.2 (18 Mar 2018 20:07)  HR: 73 (19 Mar 2018 07:40) (73 - 90)  BP: 180/83 (19 Mar 2018 07:40) (133/69 - 180/83)  BP(mean): --  RR: 18 (19 Mar 2018 07:40) (18 - 20)  SpO2: 96% (19 Mar 2018 07:40) (92% - 97%) I&O's Summary    18 Mar 2018 07:01  -  19 Mar 2018 07:00  --------------------------------------------------------  IN: 450 mL / OUT: 0 mL / NET: 450 mL        General:  [ ] Alert  [ ] Oriented x      [ x] Lethargic  [ ] Agitated   [ ] Cachexia   [ ] Unarousable  [ ] Verbal  [x ] Non-Verbal    HEENT:  [ ] Normal   [ x] Dry mouth   [ ] ET Tube    [ ] Trach  [ ] Oral lesions    Lungs:   [ ] Clear [ ] Tachypnea  [ x] Audible excessive secretions   [ ] Rhonchi        [ ] Right [ ] Left [ ] Bilateral  [ ] Crackles        [ ] Right [ ] Left [ ] Bilateral  [ ] Wheezing     [ ] Right [ ] Left [ ] Bilateral    Cardiovascular:  [ ] Regular [ x] Irregular [ ] Tachycardia   [ ] Bradycardia  [ ] Murmur [ ] Other    Abdomen: [ x] Soft  [ ] Distended   [x ] +BS  [ ] Non tender [ ] Tender  [ ]PEG   [ ]OGT/ NGT   Last BM:       Genitourinary: [ ] Normal [x ] Incontinent   [ ] Oliguria/Anuria   [ ] Chawla    Musculoskeletal:  [ ] Normal   [x ] Weakness  [x ] Bedbound/Wheelchair bound [ ] Edema    Neurological: [ ] No focal deficits  [x ] Cognitive impairment  [ ] Dysphagia [ ] Dysarthria [ ] Paresis [ ] Other     Skin: [x ] Normal   [ ] Pressure ulcer(s)                  [ ] Rash    LABS:                        14.5   8.2   )-----------( 201      ( 18 Mar 2018 14:04 )             42.2     03-18    132<L>  |  90<L>  |  14  ----------------------------<  128<H>  3.9   |  29  |  0.52    Ca    9.8      18 Mar 2018 14:04    TPro  8.6<H>  /  Alb  4.6  /  TBili  0.6  /  DBili  x   /  AST  21  /  ALT  14  /  AlkPhos  124<H>  03-18    PT/INR - ( 18 Mar 2018 14:04 )   PT: 10.4 sec;   INR: 0.96 ratio         PTT - ( 18 Mar 2018 14:04 )  PTT:28.6 sec      Shock: [ ] Septic [ ] Cardiogenic [ ] Neurologic [ ] Hypovolemic  Vasopressors x   Inotrophs x     Protein Calorie Malnutrition: [ ] Mild [ x] Moderate [ ] Severe    Oral Intake: [ x] Unable/mouth care only [ ] Minimal [ ] Moderate [ ] Full Capability  Diet: [x ] NPO [ ] Tube feeds [ ] TPN [ ] Other     RADIOLOGY & ADDITIONAL STUDIES:    < from: MR Head No Cont (03.17.18 @ 09:45) >  IMPRESSION:  Moderately large right hemispheric subdural hematoma as described on the   immediately preceding CT report  Chronic microvascular ischemic change with microhemorrhages implying   chronic hypertensive angiopathy      < end of copied text >      REFERRALS:   [ ] Chaplaincy  [ ] Hospice  [ ] Child Life  [ ] Social Work  [ ] Case management [ ] Holistic Therapy

## 2018-03-19 NOTE — PROGRESS NOTE ADULT - SUBJECTIVE AND OBJECTIVE BOX
Conversation held with son, two daughters, and extended family. Everyone is in agreement that she would not want to be intubated, or resuscitated in the event of a cardiac arrest. They do believe she would like to continue feeds possibly via NGT vs. PEG. Will continue conversations with palliative tomorrow. DNR form signed and witnessed.

## 2018-03-19 NOTE — PROGRESS NOTE ADULT - SUBJECTIVE AND OBJECTIVE BOX
SUBJECTIVE: Pt seen and examined with the family at bedside translating.     PAST MEDICAL & SURGICAL HISTORY:  No pertinent past medical history  S/P hysterectomy    Vital Signs Last 24 Hrs  T(C): 36.7 (19 Mar 2018 07:40), Max: 38.4 (18 Mar 2018 20:07)  T(F): 98 (19 Mar 2018 07:40), Max: 101.2 (18 Mar 2018 20:07)  HR: 73 (19 Mar 2018 07:40) (73 - 90)  BP: 180/83 (19 Mar 2018 07:40) (133/69 - 180/83)  RR: 18 (19 Mar 2018 07:40) (18 - 20)  SpO2: 96% (19 Mar 2018 07:40) (92% - 97%)                       14.5   8.2   )-----------( 201      ( 18 Mar 2018 14:04 )             42.2    03-18    132<L>  |  90<L>  |  14  ----------------------------<  128<H>  3.9   |  29  |  0.52    Ca    9.8      18 Mar 2018 14:04    TPro  8.6<H>  /  Alb  4.6  /  TBili  0.6  /  DBili  x   /  AST  21  /  ALT  14  /  AlkPhos  124<H>  03-18  PT/INR - ( 18 Mar 2018 14:04 )   PT: 10.4 sec;   INR: 0.96 ratio    PTT - ( 18 Mar 2018 14:04 )  PTT:28.6 sec      NEUROIMAGING: < from: MR Head No Cont (03.17.18 @ 09:45) >  Moderately large right hemispheric subdural hematoma as described on the   immediately preceding CT report  Chronic microvascular ischemic change with microhemorrhages implying   chronic hypertensive angiopathy    < end of copied text >    PHYSICAL EXAM:    General: No Acute Distress     Neurological: Arousable, opens eyes to voice, oriented to person, place with family's assistance, follows commands, right gaze preference, left sided neglect, gurgled speech, right side 4+/5, LUE 3/5 with stimulation and probing from family, LLE 3/5.     Pulmonary: Clear to Auscultation, No Rales, No Rhonchi, No Wheezes     Cardiovascular: S1, S2, Regular Rate and Rhythm     Gastrointestinal: Soft, Nontender, Nondistended     MEDICATIONS:   Antibiotics:    Neuro:  acetaminophen   Tablet 650 milliGRAM(s) Oral every 6 hours PRN For Temp greater than 38 C (100.4 F)  acetaminophen   Tablet. 650 milliGRAM(s) Oral every 6 hours PRN Mild Pain (1 - 3)    Anticoagulation:     Cardiology:  hydrALAZINE Injectable 5 milliGRAM(s) IV Push every 4 hours PRN    Endo:   dexamethasone  Injectable 4 milliGRAM(s) IV Push every 4 hours    Pulm:  ALBUTerol 90 MICROgram(s) HFA Inhaler 1 Puff(s) Inhalation every 4 hours PRN    GI/:  docusate sodium 100 milliGRAM(s) Oral three times a day  senna 2 Tablet(s) Oral at bedtime PRN    Other:  sodium chloride 0.9% with potassium chloride 20 mEq/L 1000 milliLiter(s) IV Continuous <Continuous>

## 2018-03-19 NOTE — PROGRESS NOTE ADULT - ASSESSMENT
ASSESSMENT AND PLAN: 89 y.o female w/ no medical hx, not on any blood thinners, who fell two days ago while trying to put on her pants and has a 1.3 cm R holohemispheric acute SDH w/ 7mm MLS on CTH. She was noted to be lethargic and severely dysarthric. At baseline, she lives w/ her daughter, and is capable of performing all ADLs w/ help of an aide. Patient has previously expressed a DNR status w/ her family. Pt is not inclined for surgery and patient made DNR.  Palliative called to discuss plan of care with family.     NEURO: Pain management with IV tylenol. PO meds discontinued.   Continue steroids 4Q4     PULM: Continue albuterol      CV: Keep -160. continue hydralazine pushes as needed.     ENDO: start HISS if needed     HEME/ONC:                      DVT ppx: Hold until follow up with neurosurgery     RENAL: Continue fluids     ID: Watch for further fever spikes.     GI:  Keep Pt NPO for now, likely not pass S/S.  Senna/colace discontinued since pt NPO.     DISCHARGE PLANNING: Palliative saw and discussed goals of care with family.

## 2018-03-20 DIAGNOSIS — I35.1 NONRHEUMATIC AORTIC (VALVE) INSUFFICIENCY: ICD-10-CM

## 2018-03-20 DIAGNOSIS — E87.1 HYPO-OSMOLALITY AND HYPONATREMIA: ICD-10-CM

## 2018-03-20 DIAGNOSIS — I50.22 CHRONIC SYSTOLIC (CONGESTIVE) HEART FAILURE: ICD-10-CM

## 2018-03-20 DIAGNOSIS — I10 ESSENTIAL (PRIMARY) HYPERTENSION: ICD-10-CM

## 2018-03-20 PROCEDURE — 99233 SBSQ HOSP IP/OBS HIGH 50: CPT

## 2018-03-20 PROCEDURE — 99223 1ST HOSP IP/OBS HIGH 75: CPT

## 2018-03-20 RX ORDER — DEXTROSE MONOHYDRATE, SODIUM CHLORIDE, AND POTASSIUM CHLORIDE 50; .745; 4.5 G/1000ML; G/1000ML; G/1000ML
1000 INJECTION, SOLUTION INTRAVENOUS
Qty: 0 | Refills: 0 | Status: DISCONTINUED | OUTPATIENT
Start: 2018-03-20 | End: 2018-03-22

## 2018-03-20 RX ADMIN — Medication 4 MILLIGRAM(S): at 14:12

## 2018-03-20 RX ADMIN — Medication 4 MILLIGRAM(S): at 18:11

## 2018-03-20 RX ADMIN — LEVETIRACETAM 400 MILLIGRAM(S): 250 TABLET, FILM COATED ORAL at 18:11

## 2018-03-20 RX ADMIN — Medication 4 MILLIGRAM(S): at 01:00

## 2018-03-20 RX ADMIN — Medication 4 MILLIGRAM(S): at 10:51

## 2018-03-20 RX ADMIN — DEXTROSE MONOHYDRATE, SODIUM CHLORIDE, AND POTASSIUM CHLORIDE 50 MILLILITER(S): 50; .745; 4.5 INJECTION, SOLUTION INTRAVENOUS at 22:15

## 2018-03-20 RX ADMIN — Medication 4 MILLIGRAM(S): at 22:15

## 2018-03-20 RX ADMIN — Medication 4 MILLIGRAM(S): at 05:08

## 2018-03-20 RX ADMIN — LEVETIRACETAM 400 MILLIGRAM(S): 250 TABLET, FILM COATED ORAL at 05:08

## 2018-03-20 NOTE — CONSULT NOTE ADULT - PROBLEM SELECTOR RECOMMENDATION 4
DNR rescinded as documentation incomplete .  Met with Estela, one of   six children .   We discussed DNR/DNI and family agreed that they will discuss with extended family.  No HCP , surrogates are siblings, Estela spokesperson.  We also discussed likelihood of dependancy and possibility of swallowing issues.  Will reconnect tomorrow regarding code status , for now FULL CODE.
cautious use of IVF if surgery occurs

## 2018-03-20 NOTE — CONSULT NOTE ADULT - PROBLEM SELECTOR RECOMMENDATION 9
No surgery at this time
risks of surgery d/w pt/family. pt is medically optimized if procedure required. Cautious use of fluids as pt has mild LV dysfunction with moderate aortic regurg

## 2018-03-20 NOTE — PROGRESS NOTE ADULT - ASSESSMENT
ASSESSMENT AND PLAN: 89 y.o female w/ no medical hx, not on any blood thinners, who fell two days ago while trying to put on her pants and has a 1.3 cm R holohemispheric acute SDH w/ 7mm MLS on CTH. She was noted to be lethargic and severely dysarthric. At baseline, she lives w/ her daughter, and is capable of performing all ADLs w/ help of an aide. Patient has previously expressed a DNR status w/ her family. Pt is not inclined for surgery and patient made DNR.  Palliative following, different options d/w family regarding disposition and feeding. Family decision pending.      NEURO: CT Head in AM   Seizure meds: Keppra 500mg BID IV   Pain management with IV tylenol. PO meds discontinued.   Continue steroids 4Q4     PULM: Continue albuterol      CV: Keep -160. continue hydralazine pushes as needed.     ENDO:     HEME/ONC:                      DVT ppx: Hold until follow up with neurosurgery     RENAL: Keep IVL, SBP borderline     ID: Watch for further fever spikes.     GI:  Failed bedside S/S - Keep NPO. F/u with family on decision of pleasure feeds vs. Peg feeds     DISCHARGE PLANNING: Palliative discussed goals of care with family. DNR/DNI. Hospice called to assess.  Awaiting family decisions.  Spoke to family at bedside and updated.

## 2018-03-20 NOTE — CONSULT NOTE ADULT - ASSESSMENT
Patient is a 89 yo F w/ no medical hx, not on any blood thinners, who fell two days ago while trying to put on her pants and has a 1.3 cm R holohemispheric acute SDH w/ 7mm MLS on CTH done at [facility]. Per granddaughter, when she was noted to be lethargic and severely dysarthric.  Pre hospitalization pt was independent of ADL'S,  requiring minimal assistance from aide, pt lives with arnaldo Palmer, Called for goals of care
89 F c h/o pneumonia and dysphagia p/w large right SDH

## 2018-03-20 NOTE — CONSULT NOTE ADULT - SUBJECTIVE AND OBJECTIVE BOX
Surinder Dalal  Pager [phone]  Office [phone]    CC: transferred for neurosurg eval of SDH  Hx from family and translation by family as pt's speech is slurred.  HPI:  Patient is a 89 yo F hx as below, not on any blood thinners, who fell 4 days ago while trying to put on her pants and has a right holohemispheric acute SDH w/ 7mm MLS on CTH done at [facility]. Family reports that pt's speech is a little more garbled today although she recognizes everyone. Prior to the fall the family denies recent malaise, cough, N/V/D/abd pain, CP/SOB/LEONARD, dysuria.    PAST MEDICAL & SURGICAL HISTORY:  Pneumonia  Dysphagia  S/P hysterectomy      Review of Systems:   CONSTITUTIONAL: No fever, weight loss, or fatigue  EYES: No eye pain, visual disturbances, or discharge  ENMT:  No difficulty hearing, tinnitus, vertigo; No sinus or throat pain  NECK: No pain or stiffness  BREASTS: No pain, masses, or nipple discharge  RESPIRATORY: No cough, wheezing, chills or hemoptysis; No shortness of breath  CARDIOVASCULAR: No chest pain, palpitations, dizziness, or leg swelling  GASTROINTESTINAL: No abdominal or epigastric pain. No nausea, vomiting, or hematemesis; No diarrhea or constipation. No melena or hematochezia.  GENITOURINARY: No dysuria, frequency, hematuria, or incontinence  NEUROLOGICAL: slurred speech. RUE weakness. left facial droop  SKIN: No itching, burning, rashes, or lesions   LYMPH NODES: No enlarged glands  ENDOCRINE: No heat or cold intolerance; No hair loss  MUSCULOSKELETAL: No joint pain or swelling; No muscle, back, or extremity pain  PSYCHIATRIC: No depression, anxiety, mood swings, or difficulty sleeping  HEME/LYMPH: No easy bruising, or bleeding gums  ALLERY AND IMMUNOLOGIC: No hives or eczema    Allergies    No Known Allergies    Social History:   No tobacco/etoh    FAMILY HISTORY:  No pertinent family history in first degree relatives    HOME MEDS:  Vit D    MEDICATIONS  (STANDING):  dexamethasone  Injectable 4 milliGRAM(s) IV Push every 4 hours  levETIRAcetam  IVPB 500 milliGRAM(s) IV Intermittent every 12 hours    MEDICATIONS  (PRN):  ALBUTerol    90 MICROgram(s) HFA Inhaler 1 Puff(s) Inhalation every 4 hours PRN Shortness of Breath and/or Wheezing  hydrALAZINE Injectable 10 milliGRAM(s) IV Push every 4 hours PRN SBP>160      Vital Signs Last 24 Hrs  T(C): 36.5 (20 Mar 2018 09:14), Max: 36.8 (19 Mar 2018 23:11)  T(F): 97.7 (20 Mar 2018 09:14), Max: 98.3 (19 Mar 2018 23:11)  HR: 80 (20 Mar 2018 09:14) (60 - 84)  BP: 147/72 (20 Mar 2018 09:14) (126/65 - 168/87)  BP(mean): --  RR: 18 (20 Mar 2018 09:14) (18 - 18)  SpO2: 98% (20 Mar 2018 09:14) (95% - 98%)  CAPILLARY BLOOD GLUCOSE        I&O's Summary      PHYSICAL EXAM:  GENERAL: NAD, well-developed  HEAD:  Atraumatic, Normocephalic  EYES: EOMI, PERRLA, conjunctiva and sclera clear  NECK: Supple, No JVD  CHEST/LUNG: Clear to auscultation bilaterally; No wheeze  HEART: Regular rate and rhythm; No murmurs, rubs, or gallops  ABDOMEN: Soft, Nontender, Nondistended; Bowel sounds present  EXTREMITIES:  2+ Peripheral Pulses, No clubbing, cyanosis, or edema  PSYCH: AAOx3; follows commands  NEUROLOGY: left facial droop; LUE 1-2/5; 5/5 elsewhere  SKIN: No rashes or lesions    LABS:                        14.5   8.2   )-----------( 201      ( 18 Mar 2018 14:04 )             42.2     03-18    132<L>  |  90<L>  |  14  ----------------------------<  128<H>  3.9   |  29  |  0.52    Ca    9.8      18 Mar 2018 14:04    TPro  8.6<H>  /  Alb  4.6  /  TBili  0.6  /  DBili  x   /  AST  21  /  ALT  14  /  AlkPhos  124<H>  03-18    PT/INR - ( 18 Mar 2018 14:04 )   PT: 10.4 sec;   INR: 0.96 ratio         PTT - ( 18 Mar 2018 14:04 )  PTT:28.6 sec          Culture - Urine (collected 17 Mar 2018 16:33)  Source: .Urine Clean Catch (Midstream)  Final Report (18 Mar 2018 21:07):    <10,000 CFU/ml    Normal Urogenital afshan present      RADIOLOGY & ADDITIONAL TESTS:    Imaging Personally Reviewed:    Consultant(s) Notes Reviewed:      Care Discussed with Consultants/Other Providers: neurosurg Surinder Dalal  Pager [phone]  Office [phone]    CC: transferred for neurosurg eval of SDH  Hx from family and translation by family as pt's speech is slurred.  HPI:  Patient is a 89 yo F hx as below, not on any blood thinners, who fell 4 days ago while trying to put on her pants and has a right holohemispheric acute SDH w/ 7mm MLS on CTH done at [facility]. Family reports that pt's speech is a little more garbled today although she recognizes everyone. Prior to the fall the family denies recent malaise, cough, N/V/D/abd pain, CP/SOB/LEONARD, dysuria.    PAST MEDICAL & SURGICAL HISTORY:  Pneumonia  Dysphagia  S/P hysterectomy      Review of Systems:   CONSTITUTIONAL: No fever, weight loss, or fatigue  EYES: No eye pain, visual disturbances, or discharge  ENMT:  No difficulty hearing, tinnitus, vertigo; No sinus or throat pain  NECK: No pain or stiffness  BREASTS: No pain, masses, or nipple discharge  RESPIRATORY: No cough, wheezing, chills or hemoptysis; No shortness of breath  CARDIOVASCULAR: No chest pain, palpitations, dizziness, or leg swelling  GASTROINTESTINAL: No abdominal or epigastric pain. No nausea, vomiting, or hematemesis; No diarrhea or constipation. No melena or hematochezia.  GENITOURINARY: No dysuria, frequency, hematuria, or incontinence  NEUROLOGICAL: slurred speech. RUE weakness. left facial droop  SKIN: No itching, burning, rashes, or lesions   LYMPH NODES: No enlarged glands  ENDOCRINE: No heat or cold intolerance; No hair loss  MUSCULOSKELETAL: No joint pain or swelling; No muscle, back, or extremity pain  PSYCHIATRIC: No depression, anxiety, mood swings, or difficulty sleeping  HEME/LYMPH: No easy bruising, or bleeding gums  ALLERY AND IMMUNOLOGIC: No hives or eczema    Allergies    No Known Allergies    Social History:   No tobacco/etoh    FAMILY HISTORY:  No pertinent family history in first degree relatives    HOME MEDS:  Vit D    MEDICATIONS  (STANDING):  dexamethasone  Injectable 4 milliGRAM(s) IV Push every 4 hours  levETIRAcetam  IVPB 500 milliGRAM(s) IV Intermittent every 12 hours    MEDICATIONS  (PRN):  ALBUTerol    90 MICROgram(s) HFA Inhaler 1 Puff(s) Inhalation every 4 hours PRN Shortness of Breath and/or Wheezing  hydrALAZINE Injectable 10 milliGRAM(s) IV Push every 4 hours PRN SBP>160      Vital Signs Last 24 Hrs  T(C): 36.5 (20 Mar 2018 09:14), Max: 36.8 (19 Mar 2018 23:11)  T(F): 97.7 (20 Mar 2018 09:14), Max: 98.3 (19 Mar 2018 23:11)  HR: 80 (20 Mar 2018 09:14) (60 - 84)  BP: 147/72 (20 Mar 2018 09:14) (126/65 - 168/87)  BP(mean): --  RR: 18 (20 Mar 2018 09:14) (18 - 18)  SpO2: 98% (20 Mar 2018 09:14) (95% - 98%)  CAPILLARY BLOOD GLUCOSE        I&O's Summary      PHYSICAL EXAM:  GENERAL: NAD, well-developed  HEAD:  Atraumatic, Normocephalic  EYES: EOMI, PERRLA, conjunctiva and sclera clear  NECK: Supple, No JVD  CHEST/LUNG: Clear to auscultation bilaterally; No wheeze  HEART: Regular rate and rhythm; No murmurs, rubs, or gallops  ABDOMEN: Soft, Nontender, Nondistended; Bowel sounds present  EXTREMITIES:  2+ Peripheral Pulses, No clubbing, cyanosis, or edema  PSYCH: AAOx3; follows commands  NEUROLOGY: left facial droop; LUE 1-2/5; 5/5 elsewhere  SKIN: No rashes or lesions    LABS:                        14.5   8.2   )-----------( 201      ( 18 Mar 2018 14:04 )             42.2     03-18    132<L>  |  90<L>  |  14  ----------------------------<  128<H>  3.9   |  29  |  0.52    Ca    9.8      18 Mar 2018 14:04    TPro  8.6<H>  /  Alb  4.6  /  TBili  0.6  /  DBili  x   /  AST  21  /  ALT  14  /  AlkPhos  124<H>  03-18    PT/INR - ( 18 Mar 2018 14:04 )   PT: 10.4 sec;   INR: 0.96 ratio         PTT - ( 18 Mar 2018 14:04 )  PTT:28.6 sec          Culture - Urine (collected 17 Mar 2018 16:33)  Source: .Urine Clean Catch (Midstream)  Final Report (18 Mar 2018 21:07):    <10,000 CFU/ml    Normal Urogenital afshan present    EKG reviewed by me: SHANNON alcala no ischemic changes.  CE reviewed from [facility]      RADIOLOGY & ADDITIONAL TESTS:    Imaging Personally Reviewed: CT head    Consultant(s) Notes Reviewed:      Care Discussed with Consultants/Other Providers: neurosurg

## 2018-03-20 NOTE — CONSULT NOTE ADULT - PROBLEM SELECTOR RECOMMENDATION 3
Lethargic, arouses to verbal stimulation but non verbal
Amlodipine 2.5 mg daily once daily when able

## 2018-03-20 NOTE — PROGRESS NOTE ADULT - PROBLEM SELECTOR PLAN 2
Failed swallow eval.  Discussed risks and benefits of PEG vs Pleasure feeds.  Family to discuss amongst themselves yes

## 2018-03-20 NOTE — PROGRESS NOTE ADULT - ASSESSMENT
Patient is a 89 yo F w/ no medical hx, not on any blood thinners, who fell two days ago while trying to put on her pants and has a 1.3 cm R holohemispheric acute SDH w/ 7mm MLS on CTH done at [facility]SH.  Pre hospitalization pt was independent of ADL'S,  requiring minimal assistance from aide, pt lives with arnaldo Palmer, Called for goals of care

## 2018-03-20 NOTE — PROGRESS NOTE ADULT - PROBLEM SELECTOR PLAN 4
DNR/DNI completed appropriately.  Family deciding between PEG/Pleasure feeds, Home with hospice or home care vs LTC facility.  Will follow to answer any further questions regarding these options.

## 2018-03-20 NOTE — CONSULT NOTE ADULT - PROBLEM SELECTOR RECOMMENDATION 2
Currently NPO, awaits swallow evaluation
sodium chloride 1 g q 12 hrs once able. check urine sodium and urine osm.

## 2018-03-20 NOTE — PROGRESS NOTE ADULT - SUBJECTIVE AND OBJECTIVE BOX
SUBJECTIVE: Pt seen and examined with the family at bedside.     PAST MEDICAL & SURGICAL HISTORY:  No pertinent past medical history  S/P hysterectomy    Vital Signs Last 24 Hrs  T(C): 36.5 (20 Mar 2018 09:14), Max: 36.8 (19 Mar 2018 23:11)  T(F): 97.7 (20 Mar 2018 09:14), Max: 98.3 (19 Mar 2018 23:11)  HR: 80 (20 Mar 2018 09:14) (60 - 84)  BP: 147/72 (20 Mar 2018 09:14) (126/65 - 168/87)  RR: 18 (20 Mar 2018 09:14) (18 - 18)  SpO2: 98% (20 Mar 2018 09:14) (95% - 98%)                        14.5   8.2   )-----------( 201      ( 18 Mar 2018 14:04 )             42.2    03-18    132<L>  |  90<L>  |  14  ----------------------------<  128<H>  3.9   |  29  |  0.52    Ca    9.8      18 Mar 2018 14:04    TPro  8.6<H>  /  Alb  4.6  /  TBili  0.6  /  DBili  x   /  AST  21  /  ALT  14  /  AlkPhos  124<H>  03-18  PT/INR - ( 18 Mar 2018 14:04 )   PT: 10.4 sec;   INR: 0.96 ratio    PTT - ( 18 Mar 2018 14:04 )  PTT:28.6 sec     PHYSICAL EXAM:    General: No Acute Distress     Neurological: Arousable, opens eyes to voice, oriented to person, place with family's assistance, follows commands, right gaze preference but able to cross midline, more attentive to left side, gurgled speech, right side 4/5, LUE 3/5 with stimulation, LLE 3/5.     Pulmonary: Clear to Auscultation, No Rales, No Rhonchi, No Wheezes     Cardiovascular: S1, S2, Regular Rate and Rhythm     Gastrointestinal: Soft, Nontender, Nondistended     MEDICATIONS:   Antibiotics:    Neuro:  levETIRAcetam  IVPB 500 milliGRAM(s) IV Intermittent every 12 hours    Anticoagulation:    Cardiology:  hydrALAZINE Injectable 10 milliGRAM(s) IV Push every 4 hours PRN    Endo:   dexamethasone  Injectable 4 milliGRAM(s) IV Push every 4 hours    Pulm:  ALBUTerol    90 MICROgram(s) HFA Inhaler 1 Puff(s) Inhalation every 4 hours PRN    GI/:    Other:

## 2018-03-20 NOTE — PROGRESS NOTE ADULT - SUBJECTIVE AND OBJECTIVE BOX
INTERVAL HPI/OVERNIGHT EVENTS:  No events overnight.  Pt awake , non verbal, comfortable appearing      Allergies    No Known Allergies    Intolerances      ADVANCE DIRECTIVES:    DNR Yes  Yes    MOLST [ ] YES [x ] NO     MEDICATIONS  (STANDING):  dexamethasone  Injectable 4 milliGRAM(s) IV Push every 4 hours  levETIRAcetam  IVPB 500 milliGRAM(s) IV Intermittent every 12 hours    MEDICATIONS  (PRN):  ALBUTerol    90 MICROgram(s) HFA Inhaler 1 Puff(s) Inhalation every 4 hours PRN Shortness of Breath and/or Wheezing  hydrALAZINE Injectable 10 milliGRAM(s) IV Push every 4 hours PRN SBP>160      PRESENT SYMPTOMS:  Source: [ ] Patient   [ ] Family   [ ] Team     Pain:                        [ ] No [ ] Yes             [ ] Mild [ ] Moderate [ ] Severe    Onset -  Location -  Duration -  Character -  Alleviating/Aggravating -  Radiation -  Timing -    Dyspnea:                [ ] No [ ] Yes             [ ] Mild [ ] Moderate [ ] Severe    Anxiety:                  [ ] No [ ] Yes             [ ] Mild [ ] Moderate [ ] Severe    Fatigue:                  [ ] No [ ] Yes             [ ] Mild [ ] Moderate [ ] Severe    Nausea:                  [ ] No [ ] Yes             [ ] Mild [ ] Moderate [ ] Severe    Loss of appetite:   [ ] No [ ] Yes             [ ] Mild [ ] Moderate [ ] Severe    Constipation:        [ ] No [ ] Yes             [ ] Mild [ ] Moderate [ ] Severe    Other Symptoms:  [ ] All other review of systems negative   [x ] Unable to obtain due to poor mentation     Karnofsky Performance Score/Palliative Performance Status Version 2:      20   %    PHYSICAL EXAM:  Vital Signs Last 24 Hrs  T(C): 36.5 (20 Mar 2018 09:14), Max: 36.8 (19 Mar 2018 23:11)  T(F): 97.7 (20 Mar 2018 09:14), Max: 98.3 (19 Mar 2018 23:11)  HR: 80 (20 Mar 2018 09:14) (60 - 84)  BP: 147/72 (20 Mar 2018 09:14) (126/65 - 168/87)  BP(mean): --  RR: 18 (20 Mar 2018 09:14) (18 - 18)  SpO2: 98% (20 Mar 2018 09:14) (95% - 98%) I&O's Summary      General:  [ ] Alert  [ ] Oriented x      [ x] Lethargic  [ ] Agitated   [ ] Cachexia   [ ] Unarousable  [ ] Verbal  [ x] Non-Verbal    HEENT:  [ ] Normal   [ ]x Dry mouth   [ ] ET Tube    [ ] Trach  [ ] Oral lesions    Lungs:   [ ] Clear [ ] Tachypnea  [ x] Audible excessive secretions   [ ] Rhonchi        [ ] Right [ ] Left [ ] Bilateral  [ ] Crackles        [ ] Right [ ] Left [ ] Bilateral  [ ] Wheezing     [ ] Right [ ] Left [ ] Bilateral    Cardiovascular:  [ ] Regular [x ] Irregular [ ] Tachycardia   [ ] Bradycardia  [ ] Murmur [ ] Other    Abdomen: [x ] Soft  [ ] Distended   [x ] +BS  [ ] Non tender [ ] Tender  [ ]PEG   [ ]OGT/ NGT   Last BM:       Genitourinary: [ ] Normal [ x] Incontinent   [ ] Oliguria/Anuria   [ ] Chawla    Musculoskeletal:  [ ] Normal   [ ] Weakness  [ x] Bedbound/Wheelchair bound [ ] Edema    Neurological: [ ] No focal deficits  x[ ] Cognitive impairment  [ ] Dysphagia [ ] Dysarthria [ ] Paresis [ ] Other     Skin: [x ] Normal   [ ] Pressure ulcer(s)                  [ ] Rash    LABS:                        14.5   8.2   )-----------( 201      ( 18 Mar 2018 14:04 )             42.2     03-18    132<L>  |  90<L>  |  14  ----------------------------<  128<H>  3.9   |  29  |  0.52    Ca    9.8      18 Mar 2018 14:04    TPro  8.6<H>  /  Alb  4.6  /  TBili  0.6  /  DBili  x   /  AST  21  /  ALT  14  /  AlkPhos  124<H>  03-18    PT/INR - ( 18 Mar 2018 14:04 )   PT: 10.4 sec;   INR: 0.96 ratio         PTT - ( 18 Mar 2018 14:04 )  PTT:28.6 sec      Shock: [ ] Septic [ ] Cardiogenic [ ] Neurologic [ ] Hypovolemic  Vasopressors x   Inotrophs x     Oral Intake: [ ] Unable/mouth care only [ ] Minimal [ ] Moderate [ ] Full Capability    Diet: [ ] NPO [ ] Tube feeds [ ] TPN [ ] Other     RADIOLOGY & ADDITIONAL STUDIES:    REFERRALS:   [ ] Chaplaincy  [ ] Hospice  [ ] Child Life  [ ] Social Work  [ ] Case management [ ] Holistic Therapy

## 2018-03-21 LAB
ANION GAP SERPL CALC-SCNC: 10 MMOL/L — SIGNIFICANT CHANGE UP (ref 5–17)
BUN SERPL-MCNC: 36 MG/DL — HIGH (ref 7–23)
CALCIUM SERPL-MCNC: 9.3 MG/DL — SIGNIFICANT CHANGE UP (ref 8.4–10.5)
CHLORIDE SERPL-SCNC: 103 MMOL/L — SIGNIFICANT CHANGE UP (ref 96–108)
CO2 SERPL-SCNC: 26 MMOL/L — SIGNIFICANT CHANGE UP (ref 22–31)
CREAT SERPL-MCNC: 0.53 MG/DL — SIGNIFICANT CHANGE UP (ref 0.5–1.3)
GLUCOSE SERPL-MCNC: 129 MG/DL — HIGH (ref 70–99)
OSMOLALITY SERPL: 303 MOS/KG — HIGH (ref 275–300)
OSMOLALITY UR: 846 MOS/KG — SIGNIFICANT CHANGE UP (ref 50–1200)
POTASSIUM SERPL-MCNC: 4.6 MMOL/L — SIGNIFICANT CHANGE UP (ref 3.5–5.3)
POTASSIUM SERPL-SCNC: 4.6 MMOL/L — SIGNIFICANT CHANGE UP (ref 3.5–5.3)
SODIUM SERPL-SCNC: 139 MMOL/L — SIGNIFICANT CHANGE UP (ref 135–145)
SODIUM UR-SCNC: 82 MMOL/L — SIGNIFICANT CHANGE UP

## 2018-03-21 PROCEDURE — 99232 SBSQ HOSP IP/OBS MODERATE 35: CPT

## 2018-03-21 PROCEDURE — 99233 SBSQ HOSP IP/OBS HIGH 50: CPT

## 2018-03-21 PROCEDURE — 70450 CT HEAD/BRAIN W/O DYE: CPT | Mod: 26

## 2018-03-21 RX ORDER — DEXAMETHASONE 0.5 MG/5ML
4 ELIXIR ORAL EVERY 6 HOURS
Qty: 0 | Refills: 0 | Status: DISCONTINUED | OUTPATIENT
Start: 2018-03-21 | End: 2018-03-22

## 2018-03-21 RX ADMIN — LEVETIRACETAM 400 MILLIGRAM(S): 250 TABLET, FILM COATED ORAL at 17:00

## 2018-03-21 RX ADMIN — Medication 4 MILLIGRAM(S): at 23:07

## 2018-03-21 RX ADMIN — LEVETIRACETAM 400 MILLIGRAM(S): 250 TABLET, FILM COATED ORAL at 06:18

## 2018-03-21 RX ADMIN — Medication 4 MILLIGRAM(S): at 06:18

## 2018-03-21 RX ADMIN — Medication 4 MILLIGRAM(S): at 10:52

## 2018-03-21 RX ADMIN — Medication 4 MILLIGRAM(S): at 17:01

## 2018-03-21 RX ADMIN — Medication 4 MILLIGRAM(S): at 02:12

## 2018-03-21 RX ADMIN — Medication 4 MILLIGRAM(S): at 13:26

## 2018-03-21 NOTE — ADVANCED PRACTICE NURSE CONSULT - ASSESSMENT
[facility] Admissions RN Lauren Marte met with pt's extended family including 6 children on 3/20, explained home hospice services.  At time of assessment, pt was more responsive, responding to commands in Lao, saying a few words.  Family agrees with hospice POC, but if pt continues to improve, they may wish more PT/OT/ST services than usually included in hospice care.  Family will consider home with home care and following up with [facility] when pt is home.  Consents left with family.  Pt's son Delroy to contact [facility] with decision today, no call received thus far, will follow up with son later this afternoon.
Spoke w/ the pt's son Delroy via phone re family's decision re home hospice services. He reported they are interested in d/c home with home care because the pt can receive a higher level of home PT, OT and speech than she can with hospice. Nighat Hathaway CM aware of above. Thank you.

## 2018-03-21 NOTE — PROGRESS NOTE ADULT - SUBJECTIVE AND OBJECTIVE BOX
Surinder Dalal   Pager [phone]  Office [phone]      CC: Patient is a 89y old  Female who presents with a chief complaint of lethargy w/ SDH (18 Mar 2018 12:56)    Translation by family.    SUBJECTIVE / OVERNIGHT EVENTS: Improved LUE strength. Pt more interactive. No CP/SOB    MEDICATIONS  (STANDING):  dexamethasone  Injectable 4 milliGRAM(s) IV Push every 4 hours  levETIRAcetam  IVPB 500 milliGRAM(s) IV Intermittent every 12 hours  sodium chloride 0.9% with potassium chloride 20 mEq/L 1000 milliLiter(s) (50 mL/Hr) IV Continuous <Continuous>    MEDICATIONS  (PRN):  ALBUTerol    90 MICROgram(s) HFA Inhaler 1 Puff(s) Inhalation every 4 hours PRN Shortness of Breath and/or Wheezing  hydrALAZINE Injectable 10 milliGRAM(s) IV Push every 4 hours PRN SBP>160      Vital Signs Last 24 Hrs  T(C): 36.4 (21 Mar 2018 08:35), Max: 36.7 (20 Mar 2018 16:16)  T(F): 97.5 (21 Mar 2018 08:35), Max: 98.1 (20 Mar 2018 16:16)  HR: 71 (21 Mar 2018 08:35) (64 - 71)  BP: 154/75 (21 Mar 2018 08:35) (137/64 - 156/72)  BP(mean): --  RR: 20 (21 Mar 2018 08:35) (18 - 20)  SpO2: 95% (21 Mar 2018 08:35) (95% - 97%)  CAPILLARY BLOOD GLUCOSE        I&O's Summary        PHYSICAL EXAM:    GENERAL: NAD   HEENT: EOMI, PERRL  PULM: Clear to auscultation bilaterally  CV: Regular rate and rhythm; nl S1, S2; No murmurs, rubs, or gallops  ABDOMEN: Soft, Nontender, Nondistended; Bowel sounds present  EXTREMITIES/MSK:  No edema, calf tenderness   PSYCH: AAOx3  NEUROLOGY: LUE drift c 4/5 strength          LABS:    03-21    139  |  103  |  36<H>  ----------------------------<  129<H>  4.6   |  26  |  0.53    Ca    9.3      21 Mar 2018 06:43                  RADIOLOGY & ADDITIONAL TESTS:    Imaging Personally Reviewed:    Consultant(s) Notes Reviewed:      Care Discussed with Consultants/Other Providers: neurosurg

## 2018-03-21 NOTE — PROGRESS NOTE ADULT - ASSESSMENT
Patient is a 89 yo F w/ no medical hx, not on any blood thinners, who fell two days ago while trying to put on her pants and has a 1.3 cm R holohemispheric acute SDH w/ 7mm MLS on CTH done at OSH. Per granddaughter, patient was wide awake and alert until this morning, when she was noted to be lethargic and severely dysarthric. At baseline, she lives w/ her daughter, and is capable of performing all ADLs w/ help of an aide. Patient has previously expressed a DNR status w/ her family. On exam, she is lethargic, but Ox2, w/ L hemiparesis.        PLAN:  Neuro:   - Repeat CT head today is stable  - pt is awake and alert and interacting with her family  - family would like to feed pt, pureed diet ordered  - Speech and swallow evaluation ordered  - continue keppra for seizure ppx  - continue decadron for sdh   DVT ppx:   - venodynes, chemoprophylaxis contraindicated sec to SDH  PT/OT: pending      [employer] # [id number]

## 2018-03-22 PROCEDURE — 99232 SBSQ HOSP IP/OBS MODERATE 35: CPT

## 2018-03-22 PROCEDURE — 99233 SBSQ HOSP IP/OBS HIGH 50: CPT

## 2018-03-22 RX ORDER — LEVETIRACETAM 250 MG/1
500 TABLET, FILM COATED ORAL
Qty: 0 | Refills: 0 | Status: DISCONTINUED | OUTPATIENT
Start: 2018-03-22 | End: 2018-03-24

## 2018-03-22 RX ORDER — DEXAMETHASONE 0.5 MG/5ML
4 ELIXIR ORAL EVERY 8 HOURS
Qty: 0 | Refills: 0 | Status: DISCONTINUED | OUTPATIENT
Start: 2018-03-22 | End: 2018-03-24

## 2018-03-22 RX ORDER — DOCUSATE SODIUM 100 MG
100 CAPSULE ORAL DAILY
Qty: 0 | Refills: 0 | Status: DISCONTINUED | OUTPATIENT
Start: 2018-03-22 | End: 2018-03-24

## 2018-03-22 RX ORDER — ACETAMINOPHEN 500 MG
650 TABLET ORAL EVERY 6 HOURS
Qty: 0 | Refills: 0 | Status: DISCONTINUED | OUTPATIENT
Start: 2018-03-22 | End: 2018-03-24

## 2018-03-22 RX ADMIN — Medication 4 MILLIGRAM(S): at 12:58

## 2018-03-22 RX ADMIN — Medication 10 MILLIGRAM(S): at 05:02

## 2018-03-22 RX ADMIN — Medication 4 MILLIGRAM(S): at 05:02

## 2018-03-22 RX ADMIN — LEVETIRACETAM 400 MILLIGRAM(S): 250 TABLET, FILM COATED ORAL at 05:02

## 2018-03-22 RX ADMIN — LEVETIRACETAM 500 MILLIGRAM(S): 250 TABLET, FILM COATED ORAL at 17:44

## 2018-03-22 RX ADMIN — Medication 4 MILLIGRAM(S): at 22:11

## 2018-03-22 NOTE — PROGRESS NOTE ADULT - SUBJECTIVE AND OBJECTIVE BOX
HPI:  Patient is a 89 yo F w/ no medical hx, not on any blood thinners, who fell two days ago while trying to put on her pants and has a 1.3 cm R holohemispheric acute SDH w/ 7mm MLS on CTH done at OSH. Per granddaughter, patient was wide awake and alert until this morning, when she was noted to be lethargic and severely dysarthric. At baseline, she lives w/ her daughter, and is capable of performing all ADLs w/ help of an aide. Patient has previously expressed a DNR status w/ her family.    OVERNIGHT EVENTS: Patient continues to clinically improve.  Denies headache at this time.    Vital Signs Last 24 Hrs  T(C): 36.7 (22 Mar 2018 08:41), Max: 36.8 (21 Mar 2018 15:18)  T(F): 98 (22 Mar 2018 08:41), Max: 98.3 (21 Mar 2018 15:18)  HR: 67 (22 Mar 2018 08:41) (64 - 75)  BP: 131/54 (22 Mar 2018 08:41) (126/60 - 167/77)  BP(mean): --  RR: 16 (22 Mar 2018 08:41) (16 - 18)  SpO2: 95% (22 Mar 2018 08:41) (94% - 97%)    I&O's Detail    I&O's Summary      PHYSICAL EXAM:  Neurological:  Awake and alert, oriented x3 in Irish.  Following simple commands briskly. Speech fluent  PERRL, Face symmetric  TORO: Right side 5/5, left side 4/5- slight drift in both UE/LE    Cardiovascular: +s1, s2  Respiratory: clear to auscultation b/l  Gastrointestinal: soft, non-distended, non-tender        DIET:  Pureed    LABS:    03-21    139  |  103  |  36<H>  ----------------------------<  129<H>  4.6   |  26  |  0.53    Ca    9.3      21 Mar 2018 06:43          Allergies    No Known Allergies    Intolerances      MEDICATIONS:  Antibiotics:    Neuro:  levETIRAcetam  IVPB 500 milliGRAM(s) IV Intermittent every 12 hours    Anticoagulation:    OTHER:  ALBUTerol    90 MICROgram(s) HFA Inhaler 1 Puff(s) Inhalation every 4 hours PRN  dexamethasone  Injectable 4 milliGRAM(s) IV Push every 6 hours  hydrALAZINE Injectable 10 milliGRAM(s) IV Push every 4 hours PRN    IVF:  sodium chloride 0.9% with potassium chloride 20 mEq/L 1000 milliLiter(s) IV Continuous <Continuous>    CULTURES:  Culture Results:   <10,000 CFU/ml  Normal Urogenital afshan present (03-17 @ 16:33)    RADIOLOGY & ADDITIONAL TESTS:  3/21 CTH  Stable SDH collection.  No changes from prior scan.

## 2018-03-22 NOTE — PROGRESS NOTE ADULT - SUBJECTIVE AND OBJECTIVE BOX
Surinder Dalal   Pager [phone]  Office [phone]      CC: Patient is a 89y old  Female who presents with a chief complaint of lethargy w/ SDH (18 Mar 2018 12:56)    Family translates    SUBJECTIVE / OVERNIGHT EVENTS: Feels well. No cough/sob. No N/V/D. No HA. Improved speech and LUE per family.    MEDICATIONS  (STANDING):  dexamethasone     Tablet 4 milliGRAM(s) Oral every 8 hours  docusate sodium 100 milliGRAM(s) Oral daily  levETIRAcetam 500 milliGRAM(s) Oral two times a day    MEDICATIONS  (PRN):  acetaminophen   Tablet. 650 milliGRAM(s) Oral every 6 hours PRN Mild Pain (1 - 3)  ALBUTerol    90 MICROgram(s) HFA Inhaler 1 Puff(s) Inhalation every 4 hours PRN Shortness of Breath and/or Wheezing  hydrALAZINE Injectable 10 milliGRAM(s) IV Push every 4 hours PRN SBP>160      Vital Signs Last 24 Hrs  T(C): 36.7 (22 Mar 2018 08:41), Max: 36.8 (21 Mar 2018 15:18)  T(F): 98 (22 Mar 2018 08:41), Max: 98.3 (21 Mar 2018 15:18)  HR: 67 (22 Mar 2018 08:41) (64 - 75)  BP: 131/54 (22 Mar 2018 08:41) (126/60 - 167/77)  BP(mean): --  RR: 16 (22 Mar 2018 08:41) (16 - 18)  SpO2: 95% (22 Mar 2018 08:41) (94% - 97%)  CAPILLARY BLOOD GLUCOSE        I&O's Summary        PHYSICAL EXAM:    GENERAL: NAD   HEENT: EOMI, PERRL  PULM: Clear to auscultation bilaterally  CV: Regular rate and rhythm; nl S1, S2; No murmurs, rubs, or gallops  ABDOMEN: Soft, Nontender, Nondistended; Bowel sounds present  EXTREMITIES/MSK:  No edema, calf tenderness   PSYCH: AAOx3  NEUROLOGY: improved dysarthria and LUE weakness.          LABS:    03-21    139  |  103  |  36<H>  ----------------------------<  129<H>  4.6   |  26  |  0.53    Ca    9.3      21 Mar 2018 06:43                  RADIOLOGY & ADDITIONAL TESTS:    Imaging Personally Reviewed:    Consultant(s) Notes Reviewed:      Care Discussed with Consultants/Other Providers: neurosurg

## 2018-03-22 NOTE — CHART NOTE - NSCHARTNOTEFT_GEN_A_CORE
Patient awake , alert able to state age and month , able to recognize family by name .  [place] speaking, spoke in our common language.   Educated family on aspiration precautions during  pleasure feeds.  Family has declined surgery , PEG placement and HOSPICE.   They would like to take patient home as soon as possible, with home care , PT, OT and equipment .   Signing off , please reconsult as needed.

## 2018-03-22 NOTE — PROGRESS NOTE ADULT - ASSESSMENT
HPI:  Patient is a 89 yo F w/ no medical hx, not on any blood thinners, who fell two days ago while trying to put on her pants and has a 1.3 cm R holohemispheric acute SDH w/ 7mm MLS on CTH done at OSH. Per granddaughter, patient was wide awake and alert until this morning, when she was noted to be lethargic and severely dysarthric. At baseline, she lives w/ her daughter, and is capable of performing all ADLs w/ help of an aide. Patient has previously expressed a DNR status w/ her family.     PLAN:  Neuro:   -Patient clinically improving.  Family does not wish to pursue OR at this time and would like to take the patient home with home services and follow up with Dr. Sim as outpatient.  Case management aware, and working on putting home services in place.  -Continue Decadron taper for SDH.  Tapered to 4q8 today  -Continue Keppra for Seizure ppx.  Confirm length of treatment with Dr. Sim, but likely for a total of 7 days per trauma guidelines.  -Tylenol for headache  -DNR    Respiratory:   -No active issues. Albuterol PRN  -IS as able    CV:  -Intermittent mild HTN, but nothing persistent to warrant starting a standing oral agent at this time.     Endocrine:   -No active issues     Heme/Onc:              DVT ppx SCD's only at this time given acute SDH.  Consider starting SQL tomorrow if patient remains in hospital.    Renal:   -IVL  -Voiding spontaneously     ID:   -Afebrile  -No leukocytosis    GI:   -Tolerating puree diet  -Colace for bowel regiment    Social/Family:   -Discussed plan with patients son Delroy    Discharge planning:   -Home with home services HPI:  Patient is a 89 yo F w/ no medical hx, not on any blood thinners, who fell two days ago while trying to put on her pants and has a 1.3 cm R holohemispheric acute SDH w/ 7mm MLS on CTH done at OSH. Per granddaughter, patient was wide awake and alert until this morning, when she was noted to be lethargic and severely dysarthric. At baseline, she lives w/ her daughter, and is capable of performing all ADLs w/ help of an aide. Patient has previously expressed a DNR status w/ her family.     PLAN:  Neuro:   -Patient clinically improving.  Family does not wish to pursue OR at this time and would like to take the patient home with home services and follow up with Dr. Sim as outpatient.  Case management aware, and working on putting home services in place.  -Continue Decadron taper for SDH.  Tapered to 4q8 today  -Continue Keppra for Seizure ppx.  Confirm length of treatment with Dr. Sim, but likely for a total of 7 days per trauma guidelines.  -Tylenol for headache  -DNR    Respiratory:   -No active issues. Albuterol PRN  -IS as able    CV:  -Intermittent mild HTN, but nothing persistent to warrant starting a standing oral agent at this time.     Endocrine:   -No active issues     Heme/Onc:              DVT ppx SCD's only at this time given acute SDH.  Consider starting SQL tomorrow if patient remains in hospital.    Renal:   -IVL  -Voiding spontaneously     ID:   -Afebrile  -No leukocytosis    GI:   -Tolerating puree diet  -Colace for bowel regiment    Social/Family:   -Discussed plan with patients micha Potts    Discharge planning:   -Home with home services      More than 30 minutes spent on total encounter: more than 50% of the visit was spent on educating the patient and family regarding condition,  and questions answered regarding discharge planning, present care, and future expectations.

## 2018-03-23 ENCOUNTER — TRANSCRIPTION ENCOUNTER (OUTPATIENT)
Age: 83
End: 2018-03-23

## 2018-03-23 PROCEDURE — 99232 SBSQ HOSP IP/OBS MODERATE 35: CPT

## 2018-03-23 PROCEDURE — 99233 SBSQ HOSP IP/OBS HIGH 50: CPT

## 2018-03-23 RX ORDER — POLYETHYLENE GLYCOL 3350 17 G/17G
17 POWDER, FOR SOLUTION ORAL DAILY
Qty: 0 | Refills: 0 | Status: DISCONTINUED | OUTPATIENT
Start: 2018-03-23 | End: 2018-03-24

## 2018-03-23 RX ORDER — LACTULOSE 10 G/15ML
20 SOLUTION ORAL
Qty: 0 | Refills: 0 | Status: DISCONTINUED | OUTPATIENT
Start: 2018-03-23 | End: 2018-03-24

## 2018-03-23 RX ADMIN — LEVETIRACETAM 500 MILLIGRAM(S): 250 TABLET, FILM COATED ORAL at 05:50

## 2018-03-23 RX ADMIN — Medication 4 MILLIGRAM(S): at 13:00

## 2018-03-23 RX ADMIN — Medication 4 MILLIGRAM(S): at 05:50

## 2018-03-23 RX ADMIN — LEVETIRACETAM 500 MILLIGRAM(S): 250 TABLET, FILM COATED ORAL at 17:35

## 2018-03-23 RX ADMIN — Medication 10 MILLIGRAM(S): at 12:53

## 2018-03-23 RX ADMIN — Medication 4 MILLIGRAM(S): at 21:59

## 2018-03-23 NOTE — DISCHARGE NOTE ADULT - REASON FOR ADMISSION
lethargy w/ SDH Patient is a 89 yo F w/ no medical hx, not on any blood thinners, who fell two days ago while trying to put on her pants and has a 1.3 cm R holohemispheric acute SDH w/ 7mm MLS on CTH done at OSH. Per granddaughter, patient was wide awake and alert until this morning, when she was noted to be lethargic and severely dysarthric. At baseline, she lives w/ her daughter, and is capable of performing all ADLs w/ help of an aide. Patient has previously expressed a DNR status w/ her family. On exam, she is lethargic, but Ox2, w/ L hemiparesis.

## 2018-03-23 NOTE — PHYSICAL THERAPY INITIAL EVALUATION ADULT - GENERAL OBSERVATIONS, REHAB EVAL
Pt received semi-supine in bed in NAD, family at bedside translating as needed as pt primarily Amharic speaking

## 2018-03-23 NOTE — OCCUPATIONAL THERAPY INITIAL EVALUATION ADULT - DIAGNOSIS, OT EVAL
Pt with impaired strength, balance, coordination, impulsivity impacting pt's ability to complete ADLs, functional mobility.

## 2018-03-23 NOTE — DISCHARGE NOTE ADULT - NS AS ACTIVITY OBS
Bathing allowed/Do not drive or operate machinery/No Heavy lifting/straining/Walking-Indoors allowed/All activities  with assistance/Showering allowed/Stairs allowed/Walking-Outdoors allowed

## 2018-03-23 NOTE — OCCUPATIONAL THERAPY INITIAL EVALUATION ADULT - PERTINENT HX OF CURRENT PROBLEM, REHAB EVAL
89 yo F w/ no medical hx, not on any blood thinners, fell 2 days ago while trying to put on her pants and has a 1.3 cm R holohemispheric acute SDH w/ 7mm MLS on CTH done at OSH. Per granddaughter, pt was wide awake & alert until this morning, when she was noted to be lethargic, severely dysarthric. At baseline, she lives w/ her daughter, performs all ADLs w/ help of an aide. On exam, she is lethargic, but Ox2, w/ L hemiparesis.

## 2018-03-23 NOTE — PROGRESS NOTE ADULT - SUBJECTIVE AND OBJECTIVE BOX
Surinder Dalal   Pager [phone]  Office [phone]      CC: Patient is a 89y old  Female who presents with a chief complaint of lethargy w/ SDH (23 Mar 2018 13:27)    Family translates  SUBJECTIVE / OVERNIGHT EVENTS: Mild intermittent HA. Feels well otherwise. No cough    MEDICATIONS  (STANDING):  dexamethasone     Tablet 4 milliGRAM(s) Oral every 8 hours  docusate sodium 100 milliGRAM(s) Oral daily  levETIRAcetam 500 milliGRAM(s) Oral two times a day    MEDICATIONS  (PRN):  acetaminophen   Tablet. 650 milliGRAM(s) Oral every 6 hours PRN Mild Pain (1 - 3)  ALBUTerol    90 MICROgram(s) HFA Inhaler 1 Puff(s) Inhalation every 4 hours PRN Shortness of Breath and/or Wheezing  hydrALAZINE Injectable 10 milliGRAM(s) IV Push every 4 hours PRN SBP>160      Vital Signs Last 24 Hrs  T(C): 36.4 (23 Mar 2018 12:42), Max: 37 (22 Mar 2018 19:54)  T(F): 97.6 (23 Mar 2018 12:42), Max: 98.6 (22 Mar 2018 19:54)  HR: 65 (23 Mar 2018 12:42) (64 - 80)  BP: 158/66 (23 Mar 2018 12:42) (129/72 - 158/66)  BP(mean): --  RR: 18 (23 Mar 2018 12:42) (18 - 18)  SpO2: 96% (23 Mar 2018 12:42) (93% - 98%)  CAPILLARY BLOOD GLUCOSE        I&O's Summary        PHYSICAL EXAM:    GENERAL: NAD   HEENT: EOMI, PERRL  PULM: Clear to auscultation bilaterally  CV: Regular rate and rhythm; nl S1, S2; No murmurs, rubs, or gallops  ABDOMEN: Soft, Nontender, Nondistended; Bowel sounds present  EXTREMITIES/MSK:  No edema, calf tenderness   PSYCH: AAOx3  NEUROLOGY: non-focal          LABS:                      RADIOLOGY & ADDITIONAL TESTS:    Imaging Personally Reviewed:    Consultant(s) Notes Reviewed:      Care Discussed with Consultants/Other Providers: Surinder Dalal   Pager [phone]  Office [phone]      CC: Patient is a 89y old  Female who presents with a chief complaint of lethargy w/ SDH (23 Mar 2018 13:27)    Family translates  SUBJECTIVE / OVERNIGHT EVENTS: Mild intermittent HA. Feels well otherwise. No cough. No N/V/D/abd pain. No CP/SOB. No BM    MEDICATIONS  (STANDING):  dexamethasone     Tablet 4 milliGRAM(s) Oral every 8 hours  docusate sodium 100 milliGRAM(s) Oral daily  levETIRAcetam 500 milliGRAM(s) Oral two times a day    MEDICATIONS  (PRN):  acetaminophen   Tablet. 650 milliGRAM(s) Oral every 6 hours PRN Mild Pain (1 - 3)  ALBUTerol    90 MICROgram(s) HFA Inhaler 1 Puff(s) Inhalation every 4 hours PRN Shortness of Breath and/or Wheezing  hydrALAZINE Injectable 10 milliGRAM(s) IV Push every 4 hours PRN SBP>160      Vital Signs Last 24 Hrs  T(C): 36.4 (23 Mar 2018 12:42), Max: 37 (22 Mar 2018 19:54)  T(F): 97.6 (23 Mar 2018 12:42), Max: 98.6 (22 Mar 2018 19:54)  HR: 65 (23 Mar 2018 12:42) (64 - 80)  BP: 158/66 (23 Mar 2018 12:42) (129/72 - 158/66)  BP(mean): --  RR: 18 (23 Mar 2018 12:42) (18 - 18)  SpO2: 96% (23 Mar 2018 12:42) (93% - 98%)  CAPILLARY BLOOD GLUCOSE        I&O's Summary        PHYSICAL EXAM:    GENERAL: NAD   HEENT: EOMI, PERRL  PULM: Clear to auscultation bilaterally  CV: Regular rate and rhythm; nl S1, S2; No murmurs, rubs, or gallops  ABDOMEN: Soft, Nontender, Nondistended; Bowel sounds present  EXTREMITIES/MSK:  No edema, calf tenderness   PSYCH: AAOx3  NEUROLOGY: improved dysarthria and LUE strength          LABS:                      RADIOLOGY & ADDITIONAL TESTS:    Imaging Personally Reviewed:    Consultant(s) Notes Reviewed:      Care Discussed with Consultants/Other Providers: neurosurg

## 2018-03-23 NOTE — DISCHARGE NOTE ADULT - PATIENT PORTAL LINK FT
You can access the [facility], offered by [facility], by registering with the following website: [email or web address]

## 2018-03-23 NOTE — DISCHARGE NOTE ADULT - CARE PLAN
Principal Discharge DX:	SDH (subdural hematoma)  Goal:	Comfort care  Assessment and plan of treatment:	home with home care

## 2018-03-23 NOTE — DISCHARGE NOTE ADULT - HOSPITAL COURSE
Patient is a 89 yo F w/ no medical hx, not on any blood thinners, who fell two days ago while trying to put on her pants and has a 1.3 cm R holohemispheric acute SDH w/ 7mm MLS on CTH done at OSH admitted for worsening  lethargy  and severely dysarthric speech At baseline, she lives w/ her daughter, and is capable of performing all ADLs w/ help of an aide. No neurosurgical intervention . Mental status continues to improve. Patient family declines rehab, prefers home services over home hospice. Patient has been neurologically stable and wants to go home. Will discharge on Decadron taper and keppra for seizure prophylaxis

## 2018-03-23 NOTE — OCCUPATIONAL THERAPY INITIAL EVALUATION ADULT - LIVES WITH, PROFILE
Pt lives with her daughter in a private home, 4 steps to enter, full flight to 2nd floor where bathroom is located. Pt was independent with ADLs, functional mobility, occasionally using straight cane prior to admission. Pt has a HHA 8-4:30 M-F. Family states upon discharge, pt will be on first floor only with HHA/family to assists 24/7./children

## 2018-03-23 NOTE — OCCUPATIONAL THERAPY INITIAL EVALUATION ADULT - IMPAIRMENTS CONTRIBUTING IMPAIRED BED MOBILITY, REHAB EVAL
decreased strength/impaired motor control/impaired postural control/impaired balance/cognition/impaired coordination

## 2018-03-23 NOTE — DISCHARGE NOTE ADULT - ADDITIONAL INSTRUCTIONS
Please see Dr Sim for follow up in 2 weeks Please see Dr Sim for follow up in 2 weeks  e follow up with primary care doctor for blood pressure control

## 2018-03-23 NOTE — DISCHARGE NOTE ADULT - CARE PROVIDER_API CALL
Mckayla Sim (MD; PhD), Neurological Surgery  [address] [address]  Phone: [phone]  Fax: [phone]

## 2018-03-23 NOTE — DISCHARGE NOTE ADULT - NS AS DC STROKE ED MATERIALS
Prescribed Medications/Need for Followup After Discharge/Risk Factors for Stroke/Stroke Education Booklet/Call 911 for Stroke/Stroke Warning Signs and Symptoms

## 2018-03-23 NOTE — DISCHARGE NOTE ADULT - MEDICATION SUMMARY - MEDICATIONS TO TAKE
I will START or STAY ON the medications listed below when I get home from the hospital:    Decadron  -- 2 milligram(s) orally: 2 tabs by mouth every 12 hrs x 4 doses, 1 tab by mouth every 12hrs x 2 days , 1 tab by mouth daily x 2 days then d/c  -- Indication: For Brain swelling    acetaminophen 325 mg oral tablet  -- 2 tab(s) by mouth every 6 hours, As needed, Mild Pain (1 - 3)  -- Indication: For Mild pain    albuterol 90 mcg/inh inhalation aerosol  -- 1 puff(s) inhaled every 4 hours, As needed, Shortness of Breath and/or Wheezing  -- Indication: For Bronchodilator    docusate sodium 100 mg oral capsule  -- 1 cap(s) by mouth once a day  -- Indication: For Stool softener I will START or STAY ON the medications listed below when I get home from the hospital:    dexamethasone 2 mg oral tablet  -- 2 tab(s) by mouth 2 times a day x 3 days  1 tab by mouth 2 timesa day x 2 days  1 tab daily daily x 2 days then d/c  -- It is very important that you take or use this exactly as directed.  Do not skip doses or discontinue unless directed by your doctor.  Obtain medical advice before taking any non-prescription drugs as some may affect the action of this medication.  Take with food or milk.    -- Indication: For brain swelling    acetaminophen 325 mg oral tablet  -- 2 tab(s) by mouth every 6 hours, As needed, Mild Pain (1 - 3)  -- Indication: For Mild pain    levETIRAcetam 500 mg oral tablet  -- 1 tab(s) by mouth 2 times a day, stop in 2 days  -- Indication: For SDH (subdural hematoma)    metoprolol tartrate 25 mg oral tablet  -- 1 tab(s) by mouth 2 times a day  -- Indication: For Hypertension    albuterol 90 mcg/inh inhalation aerosol  -- 1 puff(s) inhaled every 4 hours, As needed, Shortness of Breath and/or Wheezing  -- Indication: For Bronchodilator    docusate sodium 100 mg oral capsule  -- 1 cap(s) by mouth once a day  -- Indication: For Stool softener

## 2018-03-23 NOTE — OCCUPATIONAL THERAPY INITIAL EVALUATION ADULT - PLANNED THERAPY INTERVENTIONS, OT EVAL
cognitive, visual perceptual/neuromuscular re-education/fine motor coordination training/strengthening/balance training/motor coordination training/ADL retraining/bed mobility training/transfer training

## 2018-03-23 NOTE — PROGRESS NOTE ADULT - SUBJECTIVE AND OBJECTIVE BOX
SUBJECTIVE:   No complaints. Wants to walk. Wants to go home. English speaking family members at bedside  OVERNIGHT EVENTS: none    Vital Signs Last 24 Hrs  T(C): 36.5 (23 Mar 2018 08:04), Max: 37.1 (22 Mar 2018 13:05)  T(F): 97.7 (23 Mar 2018 08:04), Max: 98.7 (22 Mar 2018 13:05)  HR: 76 (23 Mar 2018 11:05) (64 - 80)  BP: 136/68 (23 Mar 2018 11:05) (115/63 - 154/60)  BP(mean): --  RR: 18 (23 Mar 2018 08:04) (18 - 18)  SpO2: 97% (23 Mar 2018 08:04) (93% - 98%)    PHYSICAL EXAM:    Constitutional: No Acute Distress     Neurological: AOx2- 3, Following Commands, Moving all Extremities with good strength . No drift appreciated. Speech clear in Kyr[place]    Pulmonary: Clear to Auscultation,    Cardiovascular: S1, S2, Regular rate and rhythm     Gastrointestinal: Soft, Non-tender, Non-distended     Extremities: No calf tenderness     LABS:           IMAGING:     3/21- CT head stable    MEDICATIONS:    acetaminophen   Tablet. 650 milliGRAM(s) Oral every 6 hours PRN Mild Pain (1 - 3)  levETIRAcetam 500 milliGRAM(s) Oral two times a day  ALBUTerol    90 MICROgram(s) HFA Inhaler 1 Puff(s) Inhalation every 4 hours PRN Shortness of Breath and/or Wheezing  docusate sodium 100 milliGRAM(s) Oral daily  dexamethasone     Tablet 4 milliGRAM(s) Oral every 8 hours      DIET:     Pureed diet

## 2018-03-23 NOTE — PROGRESS NOTE ADULT - ASSESSMENT
Patient is a 89 yo F w/ no medical hx, not on any blood thinners, who fell two days ago while trying to put on her pants and has a 1.3 cm R holohemispheric acute SDH w/ 7mm MLS on CTH done at OSH admitted for worsening  lethargy  and severely dysarthrica At baseline, she lives w/ her daughter, and is capable of performing all ADLs w/ help of an aide. No neurosurgical intervention . Mental status continues to improve    Plan:  Neuro stable. Decadron taper. On Keppra for seizure prophylaxis  Vitals stable  PT/OT- Recommended for a/c rehab. Has good family support with family/ extended family. Wants to take patient home with Home PT/OT services and DME.   Due to patients deconditioning, generalized weakness related to  Non operative right holohemispheric Subdural hematoma with mass effect and brain shift and advanced age, will require Light weight wheel chair to achieve daily tasks and therapies which cannot be achieved with cane or rolling walker or standard wheel chair. Patient will also need a semi electric hospital bed to achieve positioning and elevation which cannot be attained with  ordinary bed. Weges and bed pillows were tried and ruled out  Possible d/c home in am once equipments delivered at home. Discussed with family at bedside

## 2018-03-23 NOTE — OCCUPATIONAL THERAPY INITIAL EVALUATION ADULT - IMPAIRED TRANSFERS: SIT/STAND, REHAB EVAL
impaired balance/impaired coordination/impaired postural control/impaired motor control/decreased strength/cognition

## 2018-03-23 NOTE — PHYSICAL THERAPY INITIAL EVALUATION ADULT - ADDITIONAL COMMENTS
CT Head 3/21: Subdural hematoma again identified as described above.MR Head 3/17: Moderately large right hemispheric subdural hematoma as described on the immediately preceding CT report. Chronic microvascular ischemic change with microhemorrhages implying chronic hypertensive angiopathy    Pt lives with her daughter in a private home, 4 steps to enter, full flight to 2nd floor where bathroom is located. Pt was independent with ADLs, functional mobility, occasionally using straight cane prior to admission. Pt has a HHA 8-4:30 M-F. Family states upon discharge, pt will be on first floor only with HHA/family to assists 24/7.

## 2018-03-24 VITALS
RESPIRATION RATE: 18 BRPM | DIASTOLIC BLOOD PRESSURE: 71 MMHG | HEART RATE: 74 BPM | TEMPERATURE: 98 F | OXYGEN SATURATION: 95 % | SYSTOLIC BLOOD PRESSURE: 135 MMHG

## 2018-03-24 DIAGNOSIS — R05 COUGH: ICD-10-CM

## 2018-03-24 LAB
ANION GAP SERPL CALC-SCNC: 11 MMOL/L — SIGNIFICANT CHANGE UP (ref 5–17)
BUN SERPL-MCNC: 32 MG/DL — HIGH (ref 7–23)
CALCIUM SERPL-MCNC: 9.1 MG/DL — SIGNIFICANT CHANGE UP (ref 8.4–10.5)
CHLORIDE SERPL-SCNC: 105 MMOL/L — SIGNIFICANT CHANGE UP (ref 96–108)
CO2 SERPL-SCNC: 24 MMOL/L — SIGNIFICANT CHANGE UP (ref 22–31)
CREAT SERPL-MCNC: 0.54 MG/DL — SIGNIFICANT CHANGE UP (ref 0.5–1.3)
GLUCOSE SERPL-MCNC: 99 MG/DL — SIGNIFICANT CHANGE UP (ref 70–99)
HCT VFR BLD CALC: 38.6 % — SIGNIFICANT CHANGE UP (ref 34.5–45)
HGB BLD-MCNC: 12.8 G/DL — SIGNIFICANT CHANGE UP (ref 11.5–15.5)
MCHC RBC-ENTMCNC: 32.6 PG — SIGNIFICANT CHANGE UP (ref 27–34)
MCHC RBC-ENTMCNC: 33.2 GM/DL — SIGNIFICANT CHANGE UP (ref 32–36)
MCV RBC AUTO: 98.2 FL — SIGNIFICANT CHANGE UP (ref 80–100)
PLATELET # BLD AUTO: 235 K/UL — SIGNIFICANT CHANGE UP (ref 150–400)
POTASSIUM SERPL-MCNC: 3.8 MMOL/L — SIGNIFICANT CHANGE UP (ref 3.5–5.3)
POTASSIUM SERPL-SCNC: 3.8 MMOL/L — SIGNIFICANT CHANGE UP (ref 3.5–5.3)
RBC # BLD: 3.93 M/UL — SIGNIFICANT CHANGE UP (ref 3.8–5.2)
RBC # FLD: 11.6 % — SIGNIFICANT CHANGE UP (ref 10.3–14.5)
SODIUM SERPL-SCNC: 140 MMOL/L — SIGNIFICANT CHANGE UP (ref 135–145)
WBC # BLD: 10.7 K/UL — HIGH (ref 3.8–10.5)
WBC # FLD AUTO: 10.7 K/UL — HIGH (ref 3.8–10.5)

## 2018-03-24 PROCEDURE — 84300 ASSAY OF URINE SODIUM: CPT

## 2018-03-24 PROCEDURE — 99239 HOSP IP/OBS DSCHRG MGMT >30: CPT

## 2018-03-24 PROCEDURE — 97166 OT EVAL MOD COMPLEX 45 MIN: CPT

## 2018-03-24 PROCEDURE — 86850 RBC ANTIBODY SCREEN: CPT

## 2018-03-24 PROCEDURE — 85610 PROTHROMBIN TIME: CPT

## 2018-03-24 PROCEDURE — 70450 CT HEAD/BRAIN W/O DYE: CPT

## 2018-03-24 PROCEDURE — 85730 THROMBOPLASTIN TIME PARTIAL: CPT

## 2018-03-24 PROCEDURE — 97162 PT EVAL MOD COMPLEX 30 MIN: CPT

## 2018-03-24 PROCEDURE — 86900 BLOOD TYPING SEROLOGIC ABO: CPT

## 2018-03-24 PROCEDURE — 99233 SBSQ HOSP IP/OBS HIGH 50: CPT

## 2018-03-24 PROCEDURE — 86901 BLOOD TYPING SEROLOGIC RH(D): CPT

## 2018-03-24 PROCEDURE — 71045 X-RAY EXAM CHEST 1 VIEW: CPT

## 2018-03-24 PROCEDURE — 83930 ASSAY OF BLOOD OSMOLALITY: CPT

## 2018-03-24 PROCEDURE — 85027 COMPLETE CBC AUTOMATED: CPT

## 2018-03-24 PROCEDURE — 80053 COMPREHEN METABOLIC PANEL: CPT

## 2018-03-24 PROCEDURE — 83935 ASSAY OF URINE OSMOLALITY: CPT

## 2018-03-24 PROCEDURE — 80048 BASIC METABOLIC PNL TOTAL CA: CPT

## 2018-03-24 PROCEDURE — 71045 X-RAY EXAM CHEST 1 VIEW: CPT | Mod: 26

## 2018-03-24 RX ORDER — ACETAMINOPHEN 500 MG
2 TABLET ORAL
Qty: 0 | Refills: 0 | COMMUNITY
Start: 2018-03-24

## 2018-03-24 RX ORDER — DEXAMETHASONE 0.5 MG/5ML
2 ELIXIR ORAL
Qty: 0 | Refills: 0 | COMMUNITY

## 2018-03-24 RX ORDER — METOPROLOL TARTRATE 50 MG
1 TABLET ORAL
Qty: 60 | Refills: 0 | OUTPATIENT
Start: 2018-03-24 | End: 2018-04-22

## 2018-03-24 RX ORDER — DEXAMETHASONE 0.5 MG/5ML
2 ELIXIR ORAL
Qty: 18 | Refills: 0 | OUTPATIENT
Start: 2018-03-24 | End: 2018-03-30

## 2018-03-24 RX ORDER — LEVETIRACETAM 250 MG/1
1 TABLET, FILM COATED ORAL
Qty: 4 | Refills: 0 | OUTPATIENT
Start: 2018-03-24 | End: 2018-03-25

## 2018-03-24 RX ORDER — DOCUSATE SODIUM 100 MG
1 CAPSULE ORAL
Qty: 0 | Refills: 0 | COMMUNITY
Start: 2018-03-24

## 2018-03-24 RX ORDER — DEXAMETHASONE 0.5 MG/5ML
1 ELIXIR ORAL
Qty: 0 | Refills: 0 | COMMUNITY
Start: 2018-03-24

## 2018-03-24 RX ORDER — ALBUTEROL 90 UG/1
1 AEROSOL, METERED ORAL
Qty: 0 | Refills: 0 | COMMUNITY
Start: 2018-03-24

## 2018-03-24 RX ADMIN — LACTULOSE 20 GRAM(S): 10 SOLUTION ORAL at 06:14

## 2018-03-24 RX ADMIN — LEVETIRACETAM 500 MILLIGRAM(S): 250 TABLET, FILM COATED ORAL at 06:14

## 2018-03-24 RX ADMIN — Medication 4 MILLIGRAM(S): at 06:14

## 2018-03-24 RX ADMIN — Medication 4 MILLIGRAM(S): at 13:59

## 2018-03-24 NOTE — PROGRESS NOTE ADULT - PROBLEM SELECTOR PLAN 1
Family asking if there is a possibility of surgery to "remove blood".  Discussed high risk surgery for elderly patient but deferred conversation to neurosurgery.
monitor neuro exams. mgmt per neurosurg
monitor neuro exams. mgmt per neurosurg.
monitor neuro exams. mgmt per neurosurg. S&S eval. PT/OT eval. family/pt want to go home.
monitor neuro exams. mgmt per neurosurg.

## 2018-03-24 NOTE — PROGRESS NOTE ADULT - SUBJECTIVE AND OBJECTIVE BOX
Patient is a 89y old  Female who presents with a chief complaint of lethargy w/ SDH (23 Mar 2018 13:27)        SUBJECTIVE / OVERNIGHT EVENTS:      MEDICATIONS  (STANDING):  dexamethasone     Tablet 4 milliGRAM(s) Oral every 8 hours  docusate sodium 100 milliGRAM(s) Oral daily  lactulose Syrup 20 Gram(s) Oral two times a day  levETIRAcetam 500 milliGRAM(s) Oral two times a day  polyethylene glycol 3350 17 Gram(s) Oral daily    MEDICATIONS  (PRN):  acetaminophen   Tablet. 650 milliGRAM(s) Oral every 6 hours PRN Mild Pain (1 - 3)  ALBUTerol    90 MICROgram(s) HFA Inhaler 1 Puff(s) Inhalation every 4 hours PRN Shortness of Breath and/or Wheezing  hydrALAZINE Injectable 10 milliGRAM(s) IV Push every 4 hours PRN SBP>160      Vital Signs Last 24 Hrs  T(C): 36.7 (24 Mar 2018 08:39), Max: 36.8 (23 Mar 2018 23:05)  T(F): 98.1 (24 Mar 2018 08:39), Max: 98.3 (23 Mar 2018 23:05)  HR: 74 (24 Mar 2018 08:39) (65 - 94)  BP: 135/71 (24 Mar 2018 08:39) (121/65 - 158/66)  BP(mean): --  RR: 18 (24 Mar 2018 08:39) (18 - 18)  SpO2: 95% (24 Mar 2018 08:39) (94% - 96%)  CAPILLARY BLOOD GLUCOSE        I&O's Summary    PHYSICAL EXAM:  GENERAL: NAD   HEENT: EOMI, PERRL  PULM: Clear to auscultation bilaterally  CV: Regular rate and rhythm; nl S1, S2; No murmurs, rubs, or gallops  ABDOMEN: Soft, Nontender, Nondistended; Bowel sounds present  EXTREMITIES/MSK:  No edema, calf tenderness   PSYCH: AAOx3  NEUROLOGY: improved dysarthria and LUE strength    LABS:                        12.8   10.7  )-----------( 235      ( 24 Mar 2018 06:21 )             38.6     03-24    140  |  105  |  32<H>  ----------------------------<  99  3.8   |  24  |  0.54    Ca    9.1      24 Mar 2018 06:21                RADIOLOGY & ADDITIONAL TESTS:    Imaging Personally Reviewed:  Consultant(s) Notes Reviewed: PT  Care Discussed with Consultants/Other Providers: d/w Neurosurgery GIANCARLO Aguilar regarding plan of care Patient is a 89y old  Female who presents with a chief complaint of lethargy w/ SDH (23 Mar 2018 13:27)        SUBJECTIVE / OVERNIGHT EVENTS:   attempted to use  but patient could not hear well and answering questions inappropriately and daughter was interrupting -  #[id number]  history obtained from daughter at bedside-c/o cough since last night productive of brown sputum. vomiting last night ?due to gas that has resolved. no fevers/chills.     MEDICATIONS  (STANDING):  dexamethasone     Tablet 4 milliGRAM(s) Oral every 8 hours  docusate sodium 100 milliGRAM(s) Oral daily  lactulose Syrup 20 Gram(s) Oral two times a day  levETIRAcetam 500 milliGRAM(s) Oral two times a day  polyethylene glycol 3350 17 Gram(s) Oral daily    MEDICATIONS  (PRN):  acetaminophen   Tablet. 650 milliGRAM(s) Oral every 6 hours PRN Mild Pain (1 - 3)  ALBUTerol    90 MICROgram(s) HFA Inhaler 1 Puff(s) Inhalation every 4 hours PRN Shortness of Breath and/or Wheezing  hydrALAZINE Injectable 10 milliGRAM(s) IV Push every 4 hours PRN SBP>160      Vital Signs Last 24 Hrs  T(C): 36.7 (24 Mar 2018 08:39), Max: 36.8 (23 Mar 2018 23:05)  T(F): 98.1 (24 Mar 2018 08:39), Max: 98.3 (23 Mar 2018 23:05)  HR: 74 (24 Mar 2018 08:39) (65 - 94)  BP: 135/71 (24 Mar 2018 08:39) (121/65 - 158/66)  BP(mean): --  RR: 18 (24 Mar 2018 08:39) (18 - 18)  SpO2: 95% (24 Mar 2018 08:39) (94% - 96%)  CAPILLARY BLOOD GLUCOSE        I&O's Summary    PHYSICAL EXAM:  GENERAL: NAD   HEENT: EOMI, PERRL  PULM: mild left basilar crackles resolved with cough.   CV: Regular rate and rhythm; nl S1, S2; No murmurs, rubs, or gallops  ABDOMEN: Soft, Nontender, Nondistended; Bowel sounds present  EXTREMITIES/MSK:  No edema, calf tenderness   PSYCH: AAOx2+  NEUROLOGY: improved dysarthria and LUE strength    LABS:                        12.8   10.7  )-----------( 235      ( 24 Mar 2018 06:21 )             38.6     03-24    140  |  105  |  32<H>  ----------------------------<  99  3.8   |  24  |  0.54    Ca    9.1      24 Mar 2018 06:21                RADIOLOGY & ADDITIONAL TESTS:    Imaging Personally Reviewed:  Consultant(s) Notes Reviewed: PT  Care Discussed with Consultants/Other Providers: d/w Neurosurgery GIANCARLO Aguilar regarding plan of care

## 2018-03-24 NOTE — PROGRESS NOTE ADULT - PROBLEM SELECTOR PLAN 5
monitor for edema/SOB/crackles/JVD. currently euvolemic
monitor for edema/SOB/crackles/JVD. currently euvolemic
monitor for edema/SOB/crackles/JVD. currently euvolemic. outpt f/u
monitor for edema/SOB/crackles/JVD. currently euvolemic. outpt f/u

## 2018-03-24 NOTE — PROGRESS NOTE ADULT - PROBLEM SELECTOR PROBLEM 1
SDH (subdural hematoma)

## 2018-03-24 NOTE — PROGRESS NOTE ADULT - PROVIDER SPECIALTY LIST ADULT
Hospitalist
Neurosurgery
Palliative Care

## 2018-03-24 NOTE — PROGRESS NOTE ADULT - PROBLEM SELECTOR PLAN 2
resolved. likely due to atelectasis - did not appreciate pna on exam   gave patient incentive spirometer and taught how to use  check cxr r/o pna - if negative, patient can be discharged to follow up with pmd wbc 10.7, afebrile, no hypoxia, cxr no consolidation - likely due to atelectasis - did not appreciate pna on exam   gave patient incentive spirometer and taught how to use

## 2018-03-24 NOTE — PROGRESS NOTE ADULT - ASSESSMENT
Patient is a 89 yo female with a  1.3 cm R holohemispheric acute SDH w/ 7mm MLS on CTH done at OSH.  F/u head Ct has been stable  There was no surgical intervention for the subdural hematoma  Patient is neurologically stable   Will d/c home today with home care services

## 2018-03-24 NOTE — PROGRESS NOTE ADULT - PROBLEM SELECTOR PLAN 4
monitor for edema/SOB/crackles/JVD. currently euvolemic. outpt f/u unclear why patient received hydralazine yesturday. bp was not recorded as elevated.   bp acceptable range today - would want to avoid excessive bp control at her age. pt to follow up with pmd.

## 2018-03-24 NOTE — PROGRESS NOTE ADULT - SUBJECTIVE AND OBJECTIVE BOX
Patient is a 89 yo F w/ no medical hx, not on any blood thinners, who fell two days ago while trying to put on her pants and has a 1.3 cm R holohemispheric acute SDH w/ 7mm MLS on CTH done at OSH. Per granddaughter, patient was wide awake and alert until this morning, when she was noted to be lethargic and severely dysarthric. At baseline, she lives w/ her daughter, and is capable of performing all ADLs w/ help of an aide. Patient has previously expressed a DNR status w/ her family. On exam, she is lethargic, but Ox2, w/ L hemiparesis.    There was no surgical intervention for the subdural hematoma    Overnight event: Patient had one episode of vomiting, stated she was cleaned and changed, she received a lot of attention but would like to go home    PAST MEDICAL & SURGICAL HISTORY:  No pertinent past medical history  S/P hysterectomy    Vital Signs Last 24 Hrs  T(C): 36.7 (24 Mar 2018 08:39), Max: 36.8 (23 Mar 2018 23:05)  T(F): 98.1 (24 Mar 2018 08:39), Max: 98.3 (23 Mar 2018 23:05)  HR: 74 (24 Mar 2018 08:39) (65 - 94)  BP: 135/71 (24 Mar 2018 08:39) (121/65 - 158/66)  BP(mean): --  RR: 18 (24 Mar 2018 08:39) (18 - 18)  SpO2: 95% (24 Mar 2018 08:39) (94% - 96%)                          12.8   10.7  )-----------( 235      ( 24 Mar 2018 06:21 )             38.6    03-24    140  |  105  |  32<H>  ----------------------------<  99  3.8   |  24  |  0.54    Ca    9.1      24 Mar 2018 06:21       Stroke Core Measures      DRAIN OUTPUT:     NEUROIMAGING:     PHYSICAL EXAM:    General: No Acute Distress     Neurological: Awake, alert oriented x2  Following Commands, PERRL, EOMI, Face Symmetrical, Speech Fluent, Moving all extremities, Muscle Strength normal in all four extremities, No Drift, Sensation to Light Touch Intact    Pulmonary: Clear to Auscultation, No Rales, No Rhonchi, No Wheezes     Cardiovascular: S1, S2, Regular Rate and Rhythm     Gastrointestinal: Soft, Nontender, Nondistended     Incision:     MEDICATIONS:   Antibiotics:    Neuro:  acetaminophen   Tablet. 650 milliGRAM(s) Oral every 6 hours PRN Mild Pain (1 - 3)  levETIRAcetam 500 milliGRAM(s) Oral two times a day    Anticoagulation:    Cardiology:  hydrALAZINE Injectable 10 milliGRAM(s) IV Push every 4 hours PRN    Endo:   dexamethasone     Tablet 4 milliGRAM(s) Oral every 8 hours    Pulm:  ALBUTerol    90 MICROgram(s) HFA Inhaler 1 Puff(s) Inhalation every 4 hours PRN    GI/:  docusate sodium 100 milliGRAM(s) Oral daily  lactulose Syrup 20 Gram(s) Oral two times a day  polyethylene glycol 3350 17 Gram(s) Oral daily    Other:

## 2018-03-26 DIAGNOSIS — S06.5X0A TRAUMATIC SUBDURAL HEMORRHAGE WITHOUT LOSS OF CONSCIOUSNESS, INITIAL ENCOUNTER: ICD-10-CM

## 2018-03-26 DIAGNOSIS — I10 ESSENTIAL (PRIMARY) HYPERTENSION: ICD-10-CM
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
## 2018-03-26 DIAGNOSIS — I99.9 UNSPECIFIED DISORDER OF CIRCULATORY SYSTEM: ICD-10-CM

## 2018-03-26 DIAGNOSIS — G45.9 TRANSIENT CEREBRAL ISCHEMIC ATTACK, UNSPECIFIED: ICD-10-CM
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
## 2018-03-26 DIAGNOSIS — R55 SYNCOPE AND COLLAPSE: ICD-10-CM

## 2018-03-30 ENCOUNTER — APPOINTMENT (OUTPATIENT)
Dept: INTERNAL MEDICINE | Facility: CLINIC | Age: 83
End: 2018-03-30

## 2018-03-30 ENCOUNTER — INPATIENT (INPATIENT)
Facility: HOSPITAL | Age: 83
LOS: 1 days | DRG: 951 | End: 2018-04-01
Attending: INTERNAL MEDICINE | Admitting: INTERNAL MEDICINE
Payer: MEDICARE

## 2018-03-30 VITALS
HEART RATE: 95 BPM | DIASTOLIC BLOOD PRESSURE: 82 MMHG | TEMPERATURE: 99 F | SYSTOLIC BLOOD PRESSURE: 167 MMHG | OXYGEN SATURATION: 94 % | RESPIRATION RATE: 18 BRPM

## 2018-03-30 DIAGNOSIS — Z51.5 ENCOUNTER FOR PALLIATIVE CARE: ICD-10-CM

## 2018-03-30 DIAGNOSIS — R06.00 DYSPNEA, UNSPECIFIED: ICD-10-CM

## 2018-03-30 DIAGNOSIS — G93.40 ENCEPHALOPATHY, UNSPECIFIED: ICD-10-CM

## 2018-03-30 DIAGNOSIS — I62.00 NONTRAUMATIC SUBDURAL HEMORRHAGE, UNSPECIFIED: ICD-10-CM

## 2018-03-30 DIAGNOSIS — R54 AGE-RELATED PHYSICAL DEBILITY: ICD-10-CM

## 2018-03-30 DIAGNOSIS — Z90.710 ACQUIRED ABSENCE OF BOTH CERVIX AND UTERUS: Chronic | ICD-10-CM

## 2018-03-30 LAB
ALBUMIN SERPL ELPH-MCNC: 3.5 G/DL — SIGNIFICANT CHANGE UP (ref 3.3–5)
ALP SERPL-CCNC: 104 U/L — SIGNIFICANT CHANGE UP (ref 40–120)
ALT FLD-CCNC: 46 U/L RC — HIGH (ref 10–45)
ANION GAP SERPL CALC-SCNC: 11 MMOL/L — SIGNIFICANT CHANGE UP (ref 5–17)
APPEARANCE UR: CLEAR — SIGNIFICANT CHANGE UP
APTT BLD: 21.6 SEC — LOW (ref 27.5–37.4)
AST SERPL-CCNC: 51 U/L — HIGH (ref 10–40)
BASOPHILS # BLD AUTO: 0 K/UL — SIGNIFICANT CHANGE UP (ref 0–0.2)
BASOPHILS NFR BLD AUTO: 0 % — SIGNIFICANT CHANGE UP (ref 0–2)
BILIRUB SERPL-MCNC: 0.8 MG/DL — SIGNIFICANT CHANGE UP (ref 0.2–1.2)
BILIRUB UR-MCNC: NEGATIVE — SIGNIFICANT CHANGE UP
BUN SERPL-MCNC: 31 MG/DL — HIGH (ref 7–23)
CALCIUM SERPL-MCNC: 9.6 MG/DL — SIGNIFICANT CHANGE UP (ref 8.4–10.5)
CHLORIDE SERPL-SCNC: 96 MMOL/L — SIGNIFICANT CHANGE UP (ref 96–108)
CO2 SERPL-SCNC: 23 MMOL/L — SIGNIFICANT CHANGE UP (ref 22–31)
COLOR SPEC: YELLOW — SIGNIFICANT CHANGE UP
CREAT SERPL-MCNC: 0.46 MG/DL — LOW (ref 0.5–1.3)
DIFF PNL FLD: NEGATIVE — SIGNIFICANT CHANGE UP
EOSINOPHIL # BLD AUTO: 0 K/UL — SIGNIFICANT CHANGE UP (ref 0–0.5)
EOSINOPHIL NFR BLD AUTO: 0 % — SIGNIFICANT CHANGE UP (ref 0–6)
GAS PNL BLDV: SIGNIFICANT CHANGE UP
GAS PNL BLDV: SIGNIFICANT CHANGE UP
GLUCOSE SERPL-MCNC: 132 MG/DL — HIGH (ref 70–99)
GLUCOSE UR QL: NEGATIVE — SIGNIFICANT CHANGE UP
HCT VFR BLD CALC: 42.9 % — SIGNIFICANT CHANGE UP (ref 34.5–45)
HGB BLD-MCNC: 14.8 G/DL — SIGNIFICANT CHANGE UP (ref 11.5–15.5)
INR BLD: 0.99 RATIO — SIGNIFICANT CHANGE UP (ref 0.88–1.16)
KETONES UR-MCNC: NEGATIVE — SIGNIFICANT CHANGE UP
LEUKOCYTE ESTERASE UR-ACNC: NEGATIVE — SIGNIFICANT CHANGE UP
LYMPHOCYTES # BLD AUTO: 0.6 K/UL — LOW (ref 1–3.3)
LYMPHOCYTES # BLD AUTO: 3.2 % — LOW (ref 13–44)
MCHC RBC-ENTMCNC: 33.4 PG — SIGNIFICANT CHANGE UP (ref 27–34)
MCHC RBC-ENTMCNC: 34.5 GM/DL — SIGNIFICANT CHANGE UP (ref 32–36)
MCV RBC AUTO: 96.8 FL — SIGNIFICANT CHANGE UP (ref 80–100)
MONOCYTES # BLD AUTO: 1 K/UL — HIGH (ref 0–0.9)
MONOCYTES NFR BLD AUTO: 5.1 % — SIGNIFICANT CHANGE UP (ref 2–14)
NEUTROPHILS # BLD AUTO: 17.2 K/UL — HIGH (ref 1.8–7.4)
NEUTROPHILS NFR BLD AUTO: 91.7 % — HIGH (ref 43–77)
NITRITE UR-MCNC: NEGATIVE — SIGNIFICANT CHANGE UP
PH UR: 6.5 — SIGNIFICANT CHANGE UP (ref 5–8)
PLATELET # BLD AUTO: 214 K/UL — SIGNIFICANT CHANGE UP (ref 150–400)
POTASSIUM SERPL-MCNC: 6.6 MMOL/L — CRITICAL HIGH (ref 3.5–5.3)
POTASSIUM SERPL-SCNC: 6.6 MMOL/L — CRITICAL HIGH (ref 3.5–5.3)
PROT SERPL-MCNC: 7.4 G/DL — SIGNIFICANT CHANGE UP (ref 6–8.3)
PROT UR-MCNC: SIGNIFICANT CHANGE UP
PROTHROM AB SERPL-ACNC: 10.8 SEC — SIGNIFICANT CHANGE UP (ref 9.8–12.7)
RBC # BLD: 4.43 M/UL — SIGNIFICANT CHANGE UP (ref 3.8–5.2)
RBC # FLD: 11.6 % — SIGNIFICANT CHANGE UP (ref 10.3–14.5)
SODIUM SERPL-SCNC: 130 MMOL/L — LOW (ref 135–145)
SP GR SPEC: 1.02 — SIGNIFICANT CHANGE UP (ref 1.01–1.02)
UROBILINOGEN FLD QL: NEGATIVE — SIGNIFICANT CHANGE UP
WBC # BLD: 18.8 K/UL — HIGH (ref 3.8–10.5)
WBC # FLD AUTO: 18.8 K/UL — HIGH (ref 3.8–10.5)

## 2018-03-30 PROCEDURE — 99285 EMERGENCY DEPT VISIT HI MDM: CPT | Mod: 25,GC

## 2018-03-30 PROCEDURE — 70450 CT HEAD/BRAIN W/O DYE: CPT | Mod: 26

## 2018-03-30 PROCEDURE — 71045 X-RAY EXAM CHEST 1 VIEW: CPT | Mod: 26

## 2018-03-30 PROCEDURE — 99223 1ST HOSP IP/OBS HIGH 75: CPT | Mod: GC

## 2018-03-30 PROCEDURE — 74177 CT ABD & PELVIS W/CONTRAST: CPT | Mod: 26

## 2018-03-30 PROCEDURE — 93010 ELECTROCARDIOGRAM REPORT: CPT

## 2018-03-30 RX ORDER — ACETAMINOPHEN 500 MG
650 TABLET ORAL EVERY 6 HOURS
Qty: 0 | Refills: 0 | Status: DISCONTINUED | OUTPATIENT
Start: 2018-03-30 | End: 2018-04-01

## 2018-03-30 RX ORDER — METOPROLOL TARTRATE 50 MG
1 TABLET ORAL
Qty: 0 | Refills: 0 | COMMUNITY

## 2018-03-30 RX ORDER — ONDANSETRON 8 MG/1
4 TABLET, FILM COATED ORAL EVERY 6 HOURS
Qty: 0 | Refills: 0 | Status: DISCONTINUED | OUTPATIENT
Start: 2018-03-30 | End: 2018-04-01

## 2018-03-30 RX ORDER — DEXAMETHASONE 0.5 MG/5ML
1 ELIXIR ORAL
Qty: 0 | Refills: 0 | COMMUNITY

## 2018-03-30 RX ORDER — SODIUM CHLORIDE 9 MG/ML
500 INJECTION INTRAMUSCULAR; INTRAVENOUS; SUBCUTANEOUS ONCE
Qty: 0 | Refills: 0 | Status: COMPLETED | OUTPATIENT
Start: 2018-03-30 | End: 2018-03-30

## 2018-03-30 RX ORDER — HYDROMORPHONE HYDROCHLORIDE 2 MG/ML
0.2 INJECTION INTRAMUSCULAR; INTRAVENOUS; SUBCUTANEOUS
Qty: 0 | Refills: 0 | Status: DISCONTINUED | OUTPATIENT
Start: 2018-03-30 | End: 2018-04-01

## 2018-03-30 RX ORDER — OMEPRAZOLE 10 MG/1
1 CAPSULE, DELAYED RELEASE ORAL
Qty: 0 | Refills: 0 | COMMUNITY

## 2018-03-30 RX ORDER — ROBINUL 0.2 MG/ML
0.4 INJECTION INTRAMUSCULAR; INTRAVENOUS EVERY 6 HOURS
Qty: 0 | Refills: 0 | Status: DISCONTINUED | OUTPATIENT
Start: 2018-03-30 | End: 2018-03-31

## 2018-03-30 RX ORDER — DEXAMETHASONE 0.5 MG/5ML
2 ELIXIR ORAL DAILY
Qty: 0 | Refills: 0 | Status: DISCONTINUED | OUTPATIENT
Start: 2018-03-30 | End: 2018-04-01

## 2018-03-30 RX ADMIN — Medication 2 MILLIGRAM(S): at 18:27

## 2018-03-30 RX ADMIN — SODIUM CHLORIDE 1000 MILLILITER(S): 9 INJECTION INTRAMUSCULAR; INTRAVENOUS; SUBCUTANEOUS at 11:46

## 2018-03-30 NOTE — ED ADULT TRIAGE NOTE - CHIEF COMPLAINT QUOTE
per daughter pt was discharged from here 3/24 has been not eating and weakness pt was hospitalized for stroke per daughter

## 2018-03-30 NOTE — H&P ADULT - NSHPLABSRESULTS_GEN_ALL_CORE
LABS:                        14.8   18.8  )-----------( 214      ( 30 Mar 2018 11:38 )             42.9         130<L>  |  96  |  31<H>  ----------------------------<  132<H>  6.6<HH>   |  23  |  0.46<L>    Ca    9.6      30 Mar 2018 11:38    TPro  7.4  /  Alb  3.5  /  TBili  0.8  /  DBili  x   /  AST  51<H>  /  ALT  46<H>  /  AlkPhos  104      PT/INR - ( 30 Mar 2018 11:44 )   PT: 10.8 sec;   INR: 0.99 ratio         PTT - ( 30 Mar 2018 11:44 )  PTT:21.6 sec  Urinalysis Basic - ( 30 Mar 2018 12:01 )    Color: Yellow / Appearance: Clear / S.016 / pH: x  Gluc: x / Ketone: Negative  / Bili: Negative / Urobili: Negative   Blood: x / Protein: Trace / Nitrite: Negative   Leuk Esterase: Negative / RBC: x / WBC x   Sq Epi: x / Non Sq Epi: x / Bacteria: x    IMAGING:    < from: CT Head No Cont (18 @ 14:15) >    Interval increase in size and liquefication of a right holohemispheric   subdural hematoma measuring up to 1.8 cm in greatest thickness. There is   increased right leftward midline shift measuring up to 1.3 cm, previously   2 mm. There is new effacement of the right suprasellar cistern.

## 2018-03-30 NOTE — CONSULT NOTE ADULT - ASSESSMENT
89F here with chronicified SDH with worsening shift. Discussion had with family who wishes to pursue comfort measures through the palliative care team. Surgery via jaqui hole for drainage of SDH was offered. Risks and benefits were explained. At this time patient's family does not with to pursue any surgical intervention. They expressed wishes to make the patient comfortable. Family chose to declare patient DNR/DNI at this time with no further intervention. Recommend palliative care evaluation. No further neurosurgical intervention at this time.

## 2018-03-30 NOTE — H&P ADULT - PROBLEM SELECTOR PLAN 3
RR fluctuating. Will have available 0.2mg IV dilaudid PRN for dyspnea/pain. Glyco PRN for increased secretions. Plan discussed with family and they agree to treat symptoms with opiates.

## 2018-03-30 NOTE — H&P ADULT - NSHPPHYSICALEXAM_GEN_ALL_CORE
Karnofsky Performance Score/Palliative Performance Status Version 2: 30 %    General:  [ ] Alert  [ ] Oriented x      [x ] Lethargic  [ ] Agitated   [ ] Cachexia   [ ] Unarousable  [ ] Verbal  [ ] Non-Verbal    HEENT:  [ ] Normal   [x] Dry mouth   [ ] ET Tube    [ ] Trach  [ ] Oral lesions    Lungs:   [x ] Clear [ ] Tachypnea  [ ] Audible excessive secretions   [ ] Rhonchi        [ ] Right [ ] Left [ ] Bilateral  [ ] Crackles        [ ] Right [ ] Left [ ] Bilateral  [ ] Wheezing     [ ] Right [ ] Left [ ] Bilateral  [ ] Respiratory failure [ ] Acute [ ] Chronic [ ] Hypoxemic [ ] Hypercarbic [ ] Other    Cardiovascular:   [ x] Regular [ ] Irregular [ ] Tachycardia   [ ] Bradycardia  [ ] Murmur [ ] Other  [ ] Shock [ ] Septic [ ] Hypovolemic [ ] Neurogenic [ ] Cardiogenic   [ ] Vasopressors [ ] Inotrophs    Abdomen:   [x ] Soft  [ ] Distended   [x ] +BS  [x ] Non tender [ ] Tender  [ ]PEG   [ ]OGT/ NGT   Last BM: 2 days ago.    [ ] Other    Genitourinary:  [ ] Normal [x ] Incontinent   [ ] Oliguria/Anuria   [ ] Chawla  [ ] Other       Musculoskeletal:    [ ] Normal   [ x] Weakness  [ ] Edema  [ ] Bedbound/Wheelchair bound [ ]  Ambulatory [ ] with [ ] without assistance [ ] Functional quadriplegia    Neurological: [ ] No focal deficits  [ ] Cognitive impairment  [ ] Dysphagia [ ] Dysarthria [ ] Paresis [ ] Other   [ ] Brain compression  [ x] Cerebral edema [x ] Encephalopathy    Skin: [x ] Normal   [ ] Ulcer(s) type [ ] Diabetic [ ] Pressure [ ] Other   Stage        POA [ ]  Yes [ ]  No       [ ] Rash

## 2018-03-30 NOTE — H&P ADULT - PROBLEM SELECTOR PLAN 5
Patient is DNR/DNI, does not have HCP, has 5 children main decision maker is Estela López (the rest of children do not speak english), discussed with patient's family they do not want any further blood draws, no IV fluids, abx only if it will aide in symptom management, no artificial nutrition. Patient to be admitted to PCU.

## 2018-03-30 NOTE — H&P ADULT - REASON FOR ADMISSION
Symptom management in the setting of enlarging SDH, main goal is comfort care and aggressive symptom management.

## 2018-03-30 NOTE — ED PROVIDER NOTE - OBJECTIVE STATEMENT
89 yo F w/ no medical hx, not on any blood thinners with recent admission for fall presents with 89 yo F w/ no medical hx, not on any blood thinners with recent admission for fall and discharge 3/24 presents with generalized weakness, fatigue since discharge from hospital, and 1d h/o periumbilical pain a/w nbnb vomiting.  Pain now resolved.  Has decreased appetite today, not able to take meds 2/2 nausea.  Last bowel movement was 2d ago.  Prior to that, normal bm's.  Denies fever, chills, chest pain, shortness of breath, falls, headache, diarrhea.      primary medical doctor: Samuel  Last admitted under: Chiquis 89 yo F w/ no medical hx, not on any blood thinners with recent admission for fall with SDH and discharge 3/24 presents with generalized weakness, fatigue since discharge from hospital, and 1d h/o periumbilical pain a/w nbnb vomiting.  Pain now resolved.  Has decreased appetite today, not able to take meds 2/2 nausea.  Last bowel movement was 2d ago.  Prior to that, normal bm's.  Denies fever, chills, chest pain, shortness of breath, falls, headache, diarrhea.      primary medical doctor: Samuel  Last admitted under: Chiquis

## 2018-03-30 NOTE — ED ADULT NURSE REASSESSMENT NOTE - NS ED NURSE REASSESS COMMENT FT1
pt is sleeping in bed comfortably with family at beside at this time. Safety and comfort maintained. NAD. Will continue to monitor.

## 2018-03-30 NOTE — H&P ADULT - HISTORY OF PRESENT ILLNESS
89 year old Female with h/o GERD, not on any blood thinners with a recent admission for fall with (3/18/18) SDH and discharged on 3/24 presents to the ED for generalized weakness, fatigue and not tolerating PO. Per patient's daughter - Estela patient had been doing well since discharge but for the past few days she became more lethargic and less responsive. Daughter only noticed that 2 days ago patient c/o periumbilical abdominal pain which resolved with Omeprazole. Daughter denies noticing any other symptoms, no fever, difficulty urinating, mild cough, does admit some nausea, denies any vomiting.     In 89 year old Female with h/o GERD, not on any blood thinners with a recent admission for fall with (3/18/18) SDH and discharged on 3/24 presents to the ED for generalized weakness, fatigue and not tolerating PO. Per patient's daughter - Estela patient had been doing well since discharge but for the past few days she became more lethargic and less responsive. Daughter only noticed that 2 days ago patient c/o periumbilical abdominal pain which resolved with Omeprazole. Daughter denies noticing any other symptoms, no fever, difficulty urinating, mild cough, does admit some nausea, denies any vomiting.     In ED patient had CTB showing Interval increase in size and liquefaction of a right holohemispheric subdural hematoma measuring up to 1.8 cm in greatest thickness with midline shift, patient evaluated by neurosurgery who offered drainage of SDH, patient's family do not want to pursue with any surgical interventions, main goal is for patient to be comfortable and not to be in pain. Patient will be admitted to PCU for symptom management in the setting of enlarging SDH with midline shift.

## 2018-03-30 NOTE — H&P ADULT - PROBLEM SELECTOR PLAN 1
Likely multifactorial: Hyponatremia + enlarging SDH with midline shift and increased ICP. Patient is lethargic, minimally responsive. Will add Ativan PRN 0.5mg IV PRN for agitation and 2mg IV for seizures.

## 2018-03-30 NOTE — H&P ADULT - NSHPREVIEWOFSYSTEMS_GEN_ALL_CORE
PRESENT SYMPTOMS:  Source: [ x] Patient   [ x] Family   [ ] Team     Pain:                        [ x] No [ ] Yes             [ ] Mild [ ] Moderate [ ] Severe    Dyspnea:                [x ] No [ ] Yes             [ ] Mild [ ] Moderate [ ] Severe    Anxiety:                  [ x] No [ ] Yes             [ ] Mild [ ] Moderate [ ] Severe    Fatigue:                  [ ] No [ ] Yes             [ ] Mild [ ] Moderate [ ] Severe --> Unable to assess due to patient's mental status.    Nausea:                  [ ] No [x ] Yes             [ ] Mild [x ] Moderate [ ] Severe    Loss of appetite:   [ ] No [x ] Yes             [ ] Mild [ ] Moderate [ x] Severe    Constipation:        [x ] No [ ] Yes             [ ] Mild [ ] Moderate [ ] Severe --> Last BM 2days ago.    Other Symptoms:  [ ] All other review of systems negative   [x ] Unable to obtain due to poor mentation

## 2018-03-30 NOTE — H&P ADULT - NSHPSOCIALHISTORY_GEN_ALL_CORE
Patient lives with daughter Estela, patient has home care in place. Patient has 5 children, one lives in [place] others live in [place]. Per daughter after last discharge patient was able to feed herself, able to bathe herself with assistance to get to the bathroom, was able to ambulate a couple of steps with assistance of walker, to go to the bathroom. Before last admission patient completely independent, able to work in her garden and was very active. Patient lives with daughter Estela, patient has home care in place. Patient has 5 children, one lives in [place] others live in [place]. Per daughter after last discharge patient was able to feed herself, able to bathe herself with assistance to get to the bathroom, was able to ambulate a couple of steps with assistance of walker, to go to the bathroom. Before last admission patient completely independent, able to work in her garden and was very active.  Estela Rene (daughter) #[phone]  Delroy Rene (son) #[phone]

## 2018-03-30 NOTE — H&P ADULT - ATTENDING COMMENTS
Agree with the above assessment and plan.   Expanding SDH and functional decline.  Family wishes for full comfort care and symptom management.

## 2018-03-30 NOTE — ED PROVIDER NOTE - ATTENDING CONTRIBUTION TO CARE
Patient recent admissions for traumatic SDH, since discharge home from neurosurgical service having worsening mental status, not moving at home, not eating and choking on food if put in her mouth.  Not speaking since fall.  Discussed with family, patient DNR/DNI.    On exam patient minimally reactive, DMM, RRR S1/S2, lungs clear, abdomen soft, limited neurologic exam, not visualized moving any extremity spontaneously.    Concerned for worsening of prior SDH, based off families wishes of DNR/DNI focus on comfort measures, will obtain labs and repeat CT head, will also consult palliative care.

## 2018-03-30 NOTE — ED ADULT NURSE NOTE - OBJECTIVE STATEMENT
89 yo female brought in by family for fatigue, phlegm, and inability to eat since yesterday. Family reports patient is usually independent at home and has recently become more fatigued since being d/c from [facility] for fall/ subdural hematoma. Family patient is not eating and the believe she is unable to swallow "because she has phlegm". Family denies reports of pain, fevers, chills, v/d. At this time in the ED the patient is sleeping in bed comfortably and arouses to verbal stimuli. Normal heart sounds heard. Lungs clear and equal mary jo. Abd soft non tender non distended. Skin is warm and dry. VSS/ NAD. Safety and comfort maintained. Family at bedside. Will continue to monitor.

## 2018-03-30 NOTE — CONSULT NOTE ADULT - SUBJECTIVE AND OBJECTIVE BOX
p ([id number])     HPI: 2 yo F w/ no medical hx, not on any blood thinners with recent admission for fall with SDH and discharge 3/24 presents with generalized weakness, fatigue since discharge from hospital, and 1d h/o periumbilical pain a/w nbnb vomiting.  Pain now resolved.  Has decreased appetite today, not able to take meds 2/2 nausea.  Last bowel movement was 2d ago.  Prior to that, normal bm's.  Denies fever, chills, chest pain, shortness of breath, falls, headache, diarrhea.      Patient recently admitted for acute SDH. Patient back now with worsening mental status and decreased responsive ness.     PAST MEDICAL HISTORY   No pertinent past medical history    PAST SURGICAL HISTORY   S/P hysterectomy        MEDICATIONS:  Antibiotics:    Neuro:    Anticoagulation:    Other:      SOCIAL HISTORY:   Occupation:   Marital Status:     FAMILY HISTORY:  No pertinent family history in first degree relatives      REVIEW OF SYSTEMS:  negative but for hpi  General:  Eyes:  ENT:  Cardiac:  Respiratory:  GI:  Musculoskeletal:   Skin:  Neurologic:   Psychiatric:     PHYSICAL EXAMINATION:   T(C): 36.3 (18 @ 10:58), Max: 37.2 (18 @ 10:44)  HR: 91 (18 @ 10:58) (91 - 95)  BP: 167/79 (18 @ 10:58) (167/79 - 167/82)  RR: 17 (18 @ 10:58) (17 - 18)  SpO2: 94% (18 @ 10:58) (94% - 94%)  Wt(kg): --    General Examination:     Neurologic Examination:           Patient not responsive, no EO, minimally reactive, further exam deferred    LABS:                        14.8   18.8  )-----------( 214      ( 30 Mar 2018 11:38 )             42.9         130<L>  |  96  |  31<H>  ----------------------------<  132<H>  6.6<HH>   |  23  |  0.46<L>    Ca    9.6      30 Mar 2018 11:38    TPro  7.4  /  Alb  3.5  /  TBili  0.8  /  DBili  x   /  AST  51<H>  /  ALT  46<H>  /  AlkPhos  104      PT/INR - ( 30 Mar 2018 11:44 )   PT: 10.8 sec;   INR: 0.99 ratio         PTT - ( 30 Mar 2018 11:44 )  PTT:21.6 sec  Urinalysis Basic - ( 30 Mar 2018 12:01 )    Color: Yellow / Appearance: Clear / S.016 / pH: x  Gluc: x / Ketone: Negative  / Bili: Negative / Urobili: Negative   Blood: x / Protein: Trace / Nitrite: Negative   Leuk Esterase: Negative / RBC: x / WBC x   Sq Epi: x / Non Sq Epi: x / Bacteria: x        RADIOLOGY & ADDITIONAL STUDIES:

## 2018-03-30 NOTE — ED PROVIDER NOTE - PROGRESS NOTE DETAILS
Palliative care consulted prior to workup, discussion with family patient DNR/DNI focus on comfort measures.  Neurosurgery consulted on CT head results but family confirmed DNR/DNI, no surgical intervention.  Palliative care evaluated patient and will admit to PCU.  Esteban Zapata M.D.

## 2018-03-30 NOTE — H&P ADULT - PROBLEM SELECTOR PLAN 2
Enlarging SDH with midline shift. Patient evaluated by NS, patient's family declined surgical interventions. Main goal is comfort care and aggressive symptom management.

## 2018-03-31 DIAGNOSIS — Z66 DO NOT RESUSCITATE: ICD-10-CM

## 2018-03-31 DIAGNOSIS — R51 HEADACHE: ICD-10-CM

## 2018-03-31 LAB
CULTURE RESULTS: NO GROWTH — SIGNIFICANT CHANGE UP
SPECIMEN SOURCE: SIGNIFICANT CHANGE UP

## 2018-03-31 PROCEDURE — 99233 SBSQ HOSP IP/OBS HIGH 50: CPT | Mod: GC

## 2018-03-31 RX ORDER — SODIUM CHLORIDE 9 MG/ML
1000 INJECTION INTRAMUSCULAR; INTRAVENOUS; SUBCUTANEOUS
Qty: 0 | Refills: 0 | Status: DISCONTINUED | OUTPATIENT
Start: 2018-03-31 | End: 2018-04-01

## 2018-03-31 RX ORDER — ROBINUL 0.2 MG/ML
0.4 INJECTION INTRAMUSCULAR; INTRAVENOUS EVERY 6 HOURS
Qty: 0 | Refills: 0 | Status: DISCONTINUED | OUTPATIENT
Start: 2018-03-31 | End: 2018-04-01

## 2018-03-31 RX ADMIN — HYDROMORPHONE HYDROCHLORIDE 0.2 MILLIGRAM(S): 2 INJECTION INTRAMUSCULAR; INTRAVENOUS; SUBCUTANEOUS at 09:29

## 2018-03-31 RX ADMIN — Medication 2 MILLIGRAM(S): at 18:07

## 2018-03-31 RX ADMIN — HYDROMORPHONE HYDROCHLORIDE 0.2 MILLIGRAM(S): 2 INJECTION INTRAMUSCULAR; INTRAVENOUS; SUBCUTANEOUS at 13:03

## 2018-03-31 RX ADMIN — ROBINUL 0.4 MILLIGRAM(S): 0.2 INJECTION INTRAMUSCULAR; INTRAVENOUS at 09:28

## 2018-03-31 RX ADMIN — SODIUM CHLORIDE 10 MILLILITER(S): 9 INJECTION INTRAMUSCULAR; INTRAVENOUS; SUBCUTANEOUS at 12:56

## 2018-03-31 RX ADMIN — HYDROMORPHONE HYDROCHLORIDE 0.2 MILLIGRAM(S): 2 INJECTION INTRAMUSCULAR; INTRAVENOUS; SUBCUTANEOUS at 13:18

## 2018-03-31 RX ADMIN — HYDROMORPHONE HYDROCHLORIDE 0.2 MILLIGRAM(S): 2 INJECTION INTRAMUSCULAR; INTRAVENOUS; SUBCUTANEOUS at 09:45

## 2018-03-31 RX ADMIN — ROBINUL 0.4 MILLIGRAM(S): 0.2 INJECTION INTRAMUSCULAR; INTRAVENOUS at 18:07

## 2018-03-31 NOTE — PROGRESS NOTE ADULT - PROBLEM SELECTOR PLAN 2
ikely multifactorial: Hyponatremia + enlarging SDH with midline shift and increased ICP. Patient is lethargic, minimally responsive. Will add Ativan PRN 0.5mg IV PRN for agitation and 2mg IV for seizures. Multifactorial: Hyponatremia + enlarging SDH with midline shift and increased ICP. Continue Ativan PRN 0.5mg IV PRN for agitation and 2mg IV for seizures.  No PRN doses overnight

## 2018-03-31 NOTE — PROGRESS NOTE ADULT - PROBLEM SELECTOR PLAN 4
Enlarging SDH with midline shift. Patient evaluated by NS, patient's family declined surgical interventions. Main goal is comfort care and aggressive symptom management Enlarging SDH with midline shift.  Patient evaluated by NS, patient's family declined surgical interventions.  Main goal is comfort care and aggressive symptom management

## 2018-03-31 NOTE — PROGRESS NOTE ADULT - PROBLEM SELECTOR PLAN 3
Will have available 0.2mg IV dilaudid PRN for dyspnea/pain. Glyco PRN for increased secretions. Plan discussed with family and they agree to treat symptoms with opiates No respiratory distress today.  Continue Dilaudid 0.2mg IV PRN for dyspnea.  Schedule Glyco ATC for increased secretions

## 2018-03-31 NOTE — PROGRESS NOTE ADULT - ASSESSMENT
89 year old Female with h/o GERD, with a recent admission for fall with (3/18/18) SDH and discharged on 3/24. Admitted for Altered Mental Status secondary to enlarging SDH with midline shift. After GOC discussions with patient's family, main goal is for comfort care. Patient will be admitted to PCU for aggressive symptom management.

## 2018-03-31 NOTE — PROGRESS NOTE ADULT - SUBJECTIVE AND OBJECTIVE BOX
Geriatric and Palliative Care Unit Progress Note [ ] Hospice Progress Note [ ]     HPI:  89 year old Female with h/o GERD, not on any blood thinners with a recent admission for fall with (3/18/18) SDH and discharged on 3/24 presents to the ED for generalized weakness, fatigue and not tolerating PO. Per patient's daughter - Estela patient had been doing well since discharge but for the past few days she became more lethargic and less responsive. Daughter only noticed that 2 days ago patient c/o periumbilical abdominal pain which resolved with Omeprazole. Daughter denies noticing any other symptoms, no fever, difficulty urinating, mild cough, does admit some nausea, denies any vomiting.     In ED patient had CTB showing Interval increase in size and liquefaction of a right holohemispheric subdural hematoma measuring up to 1.8 cm in greatest thickness with midline shift, patient evaluated by neurosurgery who offered drainage of SDH, patient's family do not want to pursue with any surgical interventions, main goal is for patient to be comfortable and not to be in pain. Patient will be admitted to PCU for symptom management in the setting of enlarging SDH with midline shift. (30 Mar 2018 14:57)    Indication for Palliative Care Unit Services:    ADVANCE DIRECTIVES:    DNRYes    MOLST  [ ] YES [ ] NO                      [ ] Completed  Health Care Proxy [ ] YES  [ ] NO   [ ] Completed  Living Will  [ ] YES [ ] NO             [ ] Surrogate  [ ] HCP  [ ] Guardian:                                                                  Phone#:    Allergies    No Known Allergies    Intolerances        MEDICATIONS  (STANDING):  dexamethasone  Injectable 2 milliGRAM(s) IV Push daily  glycopyrrolate Injectable 0.4 milliGRAM(s) IV Push every 6 hours  sodium chloride 0.9%. 1000 milliLiter(s) (10 mL/Hr) IV Continuous <Continuous>    MEDICATIONS  (PRN):  acetaminophen  Suppository 650 milliGRAM(s) Rectal every 6 hours PRN For Temp greater than 38 C (100.4 F)  bisacodyl Suppository 10 milliGRAM(s) Rectal daily PRN Constipation  HYDROmorphone  Injectable 0.2 milliGRAM(s) IV Push every 1 hour PRN Dyspnea  HYDROmorphone  Injectable 0.2 milliGRAM(s) IV Push every 1 hour PRN Pain  LORazepam   Injectable 0.5 milliGRAM(s) IV Push every 1 hour PRN Agitation  LORazepam   Injectable 2 milliGRAM(s) IV Push every 1 hour PRN Seizures  ondansetron Injectable 4 milliGRAM(s) IV Push every 6 hours PRN Nausea and/or Vomiting      PRESENT SYMPTOMS:  Source: [ ] Patient   [ ] Family   [ ] Team     Pain:                        [ ] No [ ] Yes             [ ] Mild [ ] Moderate [ ] Severe    Onset -  Location -  Duration -  Character -  Alleviating/Aggravating -  Radiation -  Timing -      Dyspnea:                [ ] No [ ] Yes             [ ] Mild [ ] Moderate [ ] Severe    Anxiety:                  [ ] No [ ] Yes             [ ] Mild [ ] Moderate [ ] Severe    Fatigue:                  [ ] No [ ] Yes             [ ] Mild [ ] Moderate [ ] Severe    Nausea:                  [ ] No [ ] Yes             [ ] Mild [ ] Moderate [ ] Severe    Loss of appetite:   [ ] No [ ] Yes             [ ] Mild [ ] Moderate [ ] Severe    Constipation:        [ ] No [ ] Yes             [ ] Mild [ ] Moderate [ ] Severe    Other Symptoms:  [ ] All other review of systems negative   [ ] Unable to obtain due to poor mentation     Karnofsky Performance Score/Palliative Performance Status Version 2:         %    PHYSICAL EXAM:  Vital Signs Last 24 Hrs  T(C): 37.4 (31 Mar 2018 07:34), Max: 37.4 (31 Mar 2018 07:34)  T(F): 99.3 (31 Mar 2018 07:34), Max: 99.3 (31 Mar 2018 07:34)  HR: 98 (31 Mar 2018 07:34) (87 - 98)  BP: 169/87 (31 Mar 2018 07:34) (169/87 - 174/78)  BP(mean): --  RR: 18 (31 Mar 2018 07:34) (16 - 18)  SpO2: 96% (31 Mar 2018 07:34) (94% - 98%) I&O's Summary      General:  [ ] Alert  [ ] Oriented x      [ ] Lethargic  [ ] Agitated   [ ] Cachexia   [ ] Unarousable  [ ] Verbal  [ ] Non-Verbal    HEENT:  [ ] Normal   [ ] Dry mouth   [ ] ET Tube    [ ] Trach  [ ] Oral lesions    Lungs:   [ ] Clear [ ] Tachypnea  [ ] Audible excessive secretions   [ ] Rhonchi        [ ] Right [ ] Left [ ] Bilateral  [ ] Crackles        [ ] Right [ ] Left [ ] Bilateral  [ ] Wheezing     [ ] Right [ ] Left [ ] Bilateral  [ ] Respiratory failure [ ] Acute [ ] Chronic [ ] Hypoxemic [ ] Hypercarbic [ ] Other    Cardiovascular:   [ ] Regular [ ] Irregular [ ] Tachycardia   [ ] Bradycardia  [ ] Murmur [ ] Other  [ ] Shock [ ] Septic [ ] Hypovolemic [ ] Neurogenic [ ] Cardiogenic   [ ] Vasopressors [ ] Inotrophs    Abdomen:   [ ] Soft  [ ] Distended   [ ] +BS  [ ] Non tender [ ] Tender  [ ]PEG   [ ]OGT/ NGT   Last BM:     [ ] Other    Genitourinary:  [ ] Normal [ ] Incontinent   [ ] Oliguria/Anuria   [ ] Chawla  [ ] Other       Musculoskeletal:    [ ] Normal   [ ] Weakness  [ ] Edema  [ ] Bedbound/Wheelchair bound [ ]  Ambulatory [ ] with [ ] without assistance [ ] Functional quadriplegia    Neurological: [ ] No focal deficits  [ ] Cognitive impairment  [ ] Dysphagia [ ] Dysarthria [ ] Paresis [ ] Other   [ ] Brain compression  [ ] Cerebral edema [ ] Encephalopathy    Skin: [ ] Normal   [ ] Ulcer(s) type [ ] Diabetic [ ] Pressure [ ] Other   Stage        POA [ ]  Yes [ ]  No       [ ] Rash    LABS:                        14.8   18.8  )-----------( 214      ( 30 Mar 2018 11:38 )             42.9     03-30    130<L>  |  96  |  31<H>  ----------------------------<  132<H>  6.6<HH>   |  23  |  0.46<L>    Ca    9.6      30 Mar 2018 11:38    TPro  7.4  /  Alb  3.5  /  TBili  0.8  /  DBili  x   /  AST  51<H>  /  ALT  46<H>  /  AlkPhos  104  03-30    PT/INR - ( 30 Mar 2018 11:44 )   PT: 10.8 sec;   INR: 0.99 ratio         PTT - ( 30 Mar 2018 11:44 )  PTT:21.6 sec  Urinalysis Basic - ( 30 Mar 2018 12:01 )    Color: Yellow / Appearance: Clear / S.016 / pH: x  Gluc: x / Ketone: Negative  / Bili: Negative / Urobili: Negative   Blood: x / Protein: Trace / Nitrite: Negative   Leuk Esterase: Negative / RBC: x / WBC x   Sq Epi: x / Non Sq Epi: x / Bacteria: x        Protein Calorie Malnutrition: [ ] Mild [ ] Moderate [ ] Severe    Oral Intake: [ ] Unable/mouth care only [ ] Minimal [ ] Moderate [ ] Full Capability  Diet: [ ] NPO [ ] Tube feeds [ ] TPN [ ] Other     RADIOLOGY & ADDITIONAL STUDIES:    REFERRALS:   [ ] Chaplaincy  [ ] Hospice Care  [ ] Child Life  [ ] Social Work  [ ] Case management [ ] Nutrition [ ] Holistic Therapy [ ] Wound Care [ ]Physical Therapy [ ] Other [ ]See goals of care note in [place] [facility] Progress Note [x ] Hospice Progress Note [ ]     INTERIM:    HPI:  89 year old Female with h/o GERD, not on any blood thinners with a recent admission for fall with (3/18/18) SDH and discharged on 3/24 presents to the ED for generalized weakness, fatigue and not tolerating PO. Per patient's daughter - Estela patient had been doing well since discharge but for the past few days she became more lethargic and less responsive. Daughter only noticed that 2 days ago patient c/o periumbilical abdominal pain which resolved with Omeprazole. Daughter denies noticing any other symptoms, no fever, difficulty urinating, mild cough, does admit some nausea, denies any vomiting.     In ED patient had CTB showing Interval increase in size and liquefaction of a right holohemispheric subdural hematoma measuring up to 1.8 cm in greatest thickness with midline shift, patient evaluated by neurosurgery who offered drainage of SDH, patient's family do not want to pursue with any surgical interventions, main goal is for patient to be comfortable and not to be in pain. Patient will be admitted to PCU for symptom management in the setting of enlarging SDH with midline shift. (30 Mar 2018 14:57)    Indication for Palliative Care Unit Services: symptom management    ADVANCE DIRECTIVES:    DNRYes    MOLST  [ ] YES [ ] NO                      [ ] Completed  Health Care Proxy [ ] YES  [ ] NO   [ ] Completed  Living Will  [ ] YES [ ] NO             [ ] Surrogate  [ ] HCP  [ ] Guardian:                                                                  Phone#:    Allergies    No Known Allergies    Intolerances        MEDICATIONS  (STANDING):  dexamethasone  Injectable 2 milliGRAM(s) IV Push daily  glycopyrrolate Injectable 0.4 milliGRAM(s) IV Push every 6 hours  sodium chloride 0.9%. 1000 milliLiter(s) (10 mL/Hr) IV Continuous <Continuous>    MEDICATIONS  (PRN):  acetaminophen  Suppository 650 milliGRAM(s) Rectal every 6 hours PRN For Temp greater than 38 C (100.4 F)  bisacodyl Suppository 10 milliGRAM(s) Rectal daily PRN Constipation  HYDROmorphone  Injectable 0.2 milliGRAM(s) IV Push every 1 hour PRN Dyspnea  HYDROmorphone  Injectable 0.2 milliGRAM(s) IV Push every 1 hour PRN Pain  LORazepam   Injectable 0.5 milliGRAM(s) IV Push every 1 hour PRN Agitation  LORazepam   Injectable 2 milliGRAM(s) IV Push every 1 hour PRN Seizures  ondansetron Injectable 4 milliGRAM(s) IV Push every 6 hours PRN Nausea and/or Vomiting      PRESENT SYMPTOMS:  Source: [ ] Patient   [ ] Family   [ ] Team     Pain:                        [ ] No [ ] Yes             [ ] Mild [ ] Moderate [ ] Severe    Onset -  Location -  Duration -  Character -  Alleviating/Aggravating -  Radiation -  Timing -      Dyspnea:                [ ] No [ ] Yes             [ ] Mild [ ] Moderate [ ] Severe    Anxiety:                  [ ] No [ ] Yes             [ ] Mild [ ] Moderate [ ] Severe    Fatigue:                  [ ] No [ ] Yes             [ ] Mild [ ] Moderate [ ] Severe    Nausea:                  [ ] No [ ] Yes             [ ] Mild [ ] Moderate [ ] Severe    Loss of appetite:   [ ] No [ ] Yes             [ ] Mild [ ] Moderate [ ] Severe    Constipation:        [ ] No [ ] Yes             [ ] Mild [ ] Moderate [ ] Severe    Other Symptoms:  [ ] All other review of systems negative   [ ] Unable to obtain due to poor mentation     Karnofsky Performance Score/Palliative Performance Status Version 2:         %    PHYSICAL EXAM:  Vital Signs Last 24 Hrs  T(C): 37.4 (31 Mar 2018 07:34), Max: 37.4 (31 Mar 2018 07:34)  T(F): 99.3 (31 Mar 2018 07:34), Max: 99.3 (31 Mar 2018 07:34)  HR: 98 (31 Mar 2018 07:34) (87 - 98)  BP: 169/87 (31 Mar 2018 07:34) (169/87 - 174/78)  BP(mean): --  RR: 18 (31 Mar 2018 07:34) (16 - 18)  SpO2: 96% (31 Mar 2018 07:34) (94% - 98%) I&O's Summary      General:  [ ] Alert  [ ] Oriented x      [ ] Lethargic  [ ] Agitated   [ ] Cachexia   [ ] Unarousable  [ ] Verbal  [ ] Non-Verbal    HEENT:  [ ] Normal   [ ] Dry mouth   [ ] ET Tube    [ ] Trach  [ ] Oral lesions    Lungs:   [ ] Clear [ ] Tachypnea  [ ] Audible excessive secretions   [ ] Rhonchi        [ ] Right [ ] Left [ ] Bilateral  [ ] Crackles        [ ] Right [ ] Left [ ] Bilateral  [ ] Wheezing     [ ] Right [ ] Left [ ] Bilateral  [ ] Respiratory failure [ ] Acute [ ] Chronic [ ] Hypoxemic [ ] Hypercarbic [ ] Other    Cardiovascular:   [ ] Regular [ ] Irregular [ ] Tachycardia   [ ] Bradycardia  [ ] Murmur [ ] Other  [ ] Shock [ ] Septic [ ] Hypovolemic [ ] Neurogenic [ ] Cardiogenic   [ ] Vasopressors [ ] Inotrophs    Abdomen:   [ ] Soft  [ ] Distended   [ ] +BS  [ ] Non tender [ ] Tender  [ ]PEG   [ ]OGT/ NGT   Last BM:     [ ] Other    Genitourinary:  [ ] Normal [ ] Incontinent   [ ] Oliguria/Anuria   [ ] Chawla  [ ] Other       Musculoskeletal:    [ ] Normal   [ ] Weakness  [ ] Edema  [ ] Bedbound/Wheelchair bound [ ]  Ambulatory [ ] with [ ] without assistance [ ] Functional quadriplegia    Neurological: [ ] No focal deficits  [ ] Cognitive impairment  [ ] Dysphagia [ ] Dysarthria [ ] Paresis [ ] Other   [ ] Brain compression  [ ] Cerebral edema [ ] Encephalopathy    Skin: [ ] Normal   [ ] Ulcer(s) type [ ] Diabetic [ ] Pressure [ ] Other   Stage        POA [ ]  Yes [ ]  No       [ ] Rash    LABS:                        14.8   18.8  )-----------( 214      ( 30 Mar 2018 11:38 )             42.9     03-30    130<L>  |  96  |  31<H>  ----------------------------<  132<H>  6.6<HH>   |  23  |  0.46<L>    Ca    9.6      30 Mar 2018 11:38    TPro  7.4  /  Alb  3.5  /  TBili  0.8  /  DBili  x   /  AST  51<H>  /  ALT  46<H>  /  AlkPhos  104  03-30    PT/INR - ( 30 Mar 2018 11:44 )   PT: 10.8 sec;   INR: 0.99 ratio         PTT - ( 30 Mar 2018 11:44 )  PTT:21.6 sec  Urinalysis Basic - ( 30 Mar 2018 12:01 )    Color: Yellow / Appearance: Clear / S.016 / pH: x  Gluc: x / Ketone: Negative  / Bili: Negative / Urobili: Negative   Blood: x / Protein: Trace / Nitrite: Negative   Leuk Esterase: Negative / RBC: x / WBC x   Sq Epi: x / Non Sq Epi: x / Bacteria: x        Protein Calorie Malnutrition: [ ] Mild [ ] Moderate [ ] Severe    Oral Intake: [ ] Unable/mouth care only [ ] Minimal [ ] Moderate [ ] Full Capability  Diet: [ ] NPO [ ] Tube feeds [ ] TPN [ ] Other     RADIOLOGY & ADDITIONAL STUDIES:    REFERRALS:   [ ] Chaplaincy  [ ] Hospice Care  [ ] Child Life  [ ] Social Work  [ ] Case management [ ] Nutrition [ ] Holistic Therapy [ ] Wound Care [ ]Physical Therapy [ ] Other [ ]See goals of care note in [facility] Progress Note [x ] Hospice Progress Note [ ]     INTERIM:  Pt somnolent, but able to converse.  Per nursing staff, she slept through the night and no PRN Dilaudid or Ativan was required.  Her granddaughter is at bedside and had several questions about patient's care.  She asked about SCDs and fluids.  She said patient was complaining of headache earlier.  Patient's HCP is Estela, not present at this time.    HPI:  89 year old Female with h/o GERD, not on any blood thinners with a recent admission for fall with (3/18/18) SDH and discharged on 3/24 presents to the ED for generalized weakness, fatigue and not tolerating PO. Per patient's daughter - Estela patient had been doing well since discharge but for the past few days she became more lethargic and less responsive. Daughter only noticed that 2 days ago patient c/o periumbilical abdominal pain which resolved with Omeprazole. Daughter denies noticing any other symptoms, no fever, difficulty urinating, mild cough, does admit some nausea, denies any vomiting.     In ED patient had CTB showing Interval increase in size and liquefaction of a right holohemispheric subdural hematoma measuring up to 1.8 cm in greatest thickness with midline shift, patient evaluated by neurosurgery who offered drainage of SDH, patient's family do not want to pursue with any surgical interventions, main goal is for patient to be comfortable and not to be in pain. Patient will be admitted to PCU for symptom management in the setting of enlarging SDH with midline shift. (30 Mar 2018 14:57)    Indication for Palliative Care Unit Services: symptom management    ADVANCE DIRECTIVES:    DNRYes    MOLST  [ ] YES [x ] NO                      [ ] Completed  Health Care Proxy [x ] YES - no documentation in chart [ ] NO   [ ] Completed  Living Will  [ ] YES [ x] NO             [ ] Surrogate  [x ] HCP  [ ] Guardian:                                                             Phone#: Estela López (daughter) #[phone]    Allergies    No Known Allergies    Intolerances    MEDICATIONS  (STANDING):  dexamethasone  Injectable 2 milliGRAM(s) IV Push daily  glycopyrrolate Injectable 0.4 milliGRAM(s) IV Push every 6 hours  sodium chloride 0.9%. 1000 milliLiter(s) (10 mL/Hr) IV Continuous <Continuous>    MEDICATIONS  (PRN):  acetaminophen  Suppository 650 milliGRAM(s) Rectal every 6 hours PRN For Temp greater than 38 C (100.4 F)  bisacodyl Suppository 10 milliGRAM(s) Rectal daily PRN Constipation  HYDROmorphone  Injectable 0.2 milliGRAM(s) IV Push every 1 hour PRN Dyspnea  HYDROmorphone  Injectable 0.2 milliGRAM(s) IV Push every 1 hour PRN Pain  LORazepam   Injectable 0.5 milliGRAM(s) IV Push every 1 hour PRN Agitation  LORazepam   Injectable 2 milliGRAM(s) IV Push every 1 hour PRN Seizures  ondansetron Injectable 4 milliGRAM(s) IV Push every 6 hours PRN Nausea and/or Vomiting    PRESENT SYMPTOMS:  Source: [x ] Patient   [ x] Family   [ ] Team     Pain:                        [ ] No [x ] Yes             [x ] Mild [ ] Moderate [ ] Severe    Per granddaughter, patient c/o headache and requested pain medication.  Pt was given Dilaudid 0.2mg IV x 1 dose.    Dyspnea:                [x ] No [ ] Yes             [ ] Mild [ ] Moderate [ ] Severe    Anxiety:                  [x ] No [ ] Yes             [ ] Mild [ ] Moderate [ ] Severe    Fatigue:                  [ ] No [x] Yes             [ ] Mild [x ] Moderate [ ] Severe    Nausea:                  [x ] No [ ] Yes             [ ] Mild [ ] Moderate [ ] Severe    Loss of appetite:   [ ] No [x ] Yes             [ ] Mild [x ] Moderate [ ] Severe    Constipation:        [x ] No [ ] Yes             [ ] Mild [ ] Moderate [ ] Severe    Other Symptoms:  [x ] All other review of systems negative   [ ] Unable to obtain due to poor mentation     Karnofsky Performance Score/Palliative Performance Status Version 2:       30-40  %    PHYSICAL EXAM:  Vital Signs Last 24 Hrs  T(C): 37.4 (31 Mar 2018 07:34), Max: 37.4 (31 Mar 2018 07:34)  T(F): 99.3 (31 Mar 2018 07:34), Max: 99.3 (31 Mar 2018 07:34)  HR: 98 (31 Mar 2018 07:34) (87 - 98)  BP: 169/87 (31 Mar 2018 07:34) (169/87 - 174/78)  BP(mean): --  RR: 18 (31 Mar 2018 07:34) (16 - 18)  SpO2: 96% (31 Mar 2018 07:34) (94% - 98%) I&O's Summary      General:  [x ] somnolent, but easy to arouse  [ ] Oriented x [ ] Lethargic  [ ] Agitated   [ ] Cachexia   [ ] Unarousable  [x ] Verbal  [ ] Non-Verbal    HEENT:  [x ] Normal   [ ] Dry mouth   [ ] ET Tube    [ ] Trach  [ ] Oral lesions    Lungs:   [ x] Clear [ ] Tachypnea  [ ] Audible excessive secretions   [ ] Rhonchi        [ ] Right [ ] Left [ ] Bilateral  [ ] Crackles        [ ] Right [ ] Left [ ] Bilateral  [ ] Wheezing     [ ] Right [ ] Left [ ] Bilateral  [ ] Respiratory failure [ ] Acute [ ] Chronic [ ] Hypoxemic [ ] Hypercarbic [ ] Other    Cardiovascular:   [x ] Regular [ ] Irregular [ ] Tachycardia   [ ] Bradycardia  [ ] Murmur [ ] Other  [ ] Shock [ ] Septic [ ] Hypovolemic [ ] Neurogenic [ ] Cardiogenic   [ ] Vasopressors [ ] Inotrophs    Abdomen:   [ x] Soft  [ ] Distended   [ ] +BS  [x ] Non tender [ ] Tender  [ ]PEG   [ ]OGT/ NGT   Last BM:     [ ] Other    Genitourinary:  [ ] Normal [ ] Incontinent   [ ] Oliguria/Anuria   [x ] Chawla  [ ] Other       Musculoskeletal:    [ ] Normal   [ x] Weakness  [ ] Edema  [x ] Bedbound/Wheelchair bound [ ]  Ambulatory [ ] with [ ] without assistance [ ] Functional quadriplegia    Neurological: [ ] No focal deficits  [ x] Cognitive impairment  [ ] Dysphagia [ ] Dysarthria [ ] Paresis [ ] Other   [ ] Brain compression  [ ] Cerebral edema [ ] Encephalopathy    Skin: x[ ] Normal   [ ] Ulcer(s) type [ ] Diabetic [ ] Pressure [ ] Other   Stage        POA [ ]  Yes [ ]  No       [ ] Rash    LABS:                        14.8   18.8  )-----------( 214      ( 30 Mar 2018 11:38 )             42.9     03-30    130<L>  |  96  |  31<H>  ----------------------------<  132<H>  6.6<HH>   |  23  |  0.46<L>    Ca    9.6      30 Mar 2018 11:38    TPro  7.4  /  Alb  3.5  /  TBili  0.8  /  DBili  x   /  AST  51<H>  /  ALT  46<H>  /  AlkPhos  104  03-30    PT/INR - ( 30 Mar 2018 11:44 )   PT: 10.8 sec;   INR: 0.99 ratio         PTT - ( 30 Mar 2018 11:44 )  PTT:21.6 sec  Urinalysis Basic - ( 30 Mar 2018 12:01 )    Color: Yellow / Appearance: Clear / S.016 / pH: x  Gluc: x / Ketone: Negative  / Bili: Negative / Urobili: Negative   Blood: x / Protein: Trace / Nitrite: Negative   Leuk Esterase: Negative / RBC: x / WBC x   Sq Epi: x / Non Sq Epi: x / Bacteria: x    Protein Calorie Malnutrition: [ ] Mild [ x] Moderate [ ] Severe    Oral Intake: [ ] Unable/mouth care only [x ] Minimal [ ] Moderate [ ] Full Capability  Diet: [ ] NPO [ ] Tube feeds [ ] TPN [x ] Other pleasure feeds    RADIOLOGY & ADDITIONAL STUDIES:  Reviewed studies from 3/30/18.      REFERRALS:   [x ] Chaplaincy  [ ] Hospice Care  [ ] Child Life  [ x] Social Work  [x ] Case management [ ] Nutrition [ ] Holistic Therapy [ ] Wound Care [ ]Physical Therapy [ ] Other [ ]See goals of care note in [facility] Progress Note [x ] Hospice Progress Note [ ]     INTERIM:  Pt somnolent, but easy to arouse.  Per nursing staff, she slept through the night and no PRN Dilaudid or Ativan was required.  Her granddaughter is at bedside and had several questions about patient's care.  She asked about SCDs and fluids.  She said patient was complaining of headache earlier.  Patient's HCP is Estela, not present at this time.    HPI:  89 year old Female with h/o GERD, not on any blood thinners with a recent admission for fall with (3/18/18) SDH and discharged on 3/24 presents to the ED for generalized weakness, fatigue and not tolerating PO. Per patient's daughter - Estela patient had been doing well since discharge but for the past few days she became more lethargic and less responsive. Daughter only noticed that 2 days ago patient c/o periumbilical abdominal pain which resolved with Omeprazole. Daughter denies noticing any other symptoms, no fever, difficulty urinating, mild cough, does admit some nausea, denies any vomiting.     In ED patient had CTB showing Interval increase in size and liquefaction of a right holohemispheric subdural hematoma measuring up to 1.8 cm in greatest thickness with midline shift, patient evaluated by neurosurgery who offered drainage of SDH, patient's family do not want to pursue with any surgical interventions, main goal is for patient to be comfortable and not to be in pain. Patient will be admitted to PCU for symptom management in the setting of enlarging SDH with midline shift. (30 Mar 2018 14:57)    Indication for Palliative Care Unit Services: symptom management    ADVANCE DIRECTIVES:    DNRYes    MOLST  [ ] YES [x ] NO                      [ ] Completed  Health Care Proxy [x ] YES - no documentation in chart [ ] NO   [ ] Completed  Living Will  [ ] YES [ x] NO             [ ] Surrogate  [x ] HCP  [ ] Guardian:                                                             Phone#: Estela López (daughter) #[phone]    Allergies    No Known Allergies    Intolerances    MEDICATIONS  (STANDING):  dexamethasone  Injectable 2 milliGRAM(s) IV Push daily  glycopyrrolate Injectable 0.4 milliGRAM(s) IV Push every 6 hours  sodium chloride 0.9%. 1000 milliLiter(s) (10 mL/Hr) IV Continuous <Continuous>    MEDICATIONS  (PRN):  acetaminophen  Suppository 650 milliGRAM(s) Rectal every 6 hours PRN For Temp greater than 38 C (100.4 F)  bisacodyl Suppository 10 milliGRAM(s) Rectal daily PRN Constipation  HYDROmorphone  Injectable 0.2 milliGRAM(s) IV Push every 1 hour PRN Dyspnea  HYDROmorphone  Injectable 0.2 milliGRAM(s) IV Push every 1 hour PRN Pain  LORazepam   Injectable 0.5 milliGRAM(s) IV Push every 1 hour PRN Agitation  LORazepam   Injectable 2 milliGRAM(s) IV Push every 1 hour PRN Seizures  ondansetron Injectable 4 milliGRAM(s) IV Push every 6 hours PRN Nausea and/or Vomiting    PRESENT SYMPTOMS:  Source: [x ] Patient   [ x] Family   [ ] Team     Pain:                        [ ] No [x ] Yes             [x ] Mild [ ] Moderate [ ] Severe    Per granddaughter, patient c/o headache and requested pain medication.  Pt was given Dilaudid 0.2mg IV x 1 dose.    Dyspnea:                [x ] No [ ] Yes             [ ] Mild [ ] Moderate [ ] Severe    Anxiety:                  [x ] No [ ] Yes             [ ] Mild [ ] Moderate [ ] Severe    Fatigue:                  [ ] No [x] Yes             [ ] Mild [x ] Moderate [ ] Severe    Nausea:                  [x ] No [ ] Yes             [ ] Mild [ ] Moderate [ ] Severe    Loss of appetite:   [ ] No [x ] Yes             [ ] Mild [x ] Moderate [ ] Severe    Constipation:        [x ] No [ ] Yes             [ ] Mild [ ] Moderate [ ] Severe    Other Symptoms:  [x ] All other review of systems negative   [ ] Unable to obtain due to poor mentation     Karnofsky Performance Score/Palliative Performance Status Version 2:       30-40  %    PHYSICAL EXAM:  Vital Signs Last 24 Hrs  T(C): 37.4 (31 Mar 2018 07:34), Max: 37.4 (31 Mar 2018 07:34)  T(F): 99.3 (31 Mar 2018 07:34), Max: 99.3 (31 Mar 2018 07:34)  HR: 98 (31 Mar 2018 07:34) (87 - 98)  BP: 169/87 (31 Mar 2018 07:34) (169/87 - 174/78)  BP(mean): --  RR: 18 (31 Mar 2018 07:34) (16 - 18)  SpO2: 96% (31 Mar 2018 07:34) (94% - 98%) I&O's Summary      General:  [x ] somnolent, but easy to arouse  [ ] Oriented x [ ] Lethargic  [ ] Agitated   [ ] Cachexia   [ ] Unarousable  [x ] Verbal  [ ] Non-Verbal    HEENT:  [x ] Normal   [ ] Dry mouth   [ ] ET Tube    [ ] Trach  [ ] Oral lesions    Lungs:   [ x] Clear [ ] Tachypnea  [ ] Audible excessive secretions   [ ] Rhonchi        [ ] Right [ ] Left [ ] Bilateral  [ ] Crackles        [ ] Right [ ] Left [ ] Bilateral  [ ] Wheezing     [ ] Right [ ] Left [ ] Bilateral  [ ] Respiratory failure [ ] Acute [ ] Chronic [ ] Hypoxemic [ ] Hypercarbic [ ] Other    Cardiovascular:   [x ] Regular [ ] Irregular [ ] Tachycardia   [ ] Bradycardia  [ ] Murmur [ ] Other  [ ] Shock [ ] Septic [ ] Hypovolemic [ ] Neurogenic [ ] Cardiogenic   [ ] Vasopressors [ ] Inotrophs    Abdomen:   [ x] Soft  [ ] Distended   [ ] +BS  [x ] Non tender [ ] Tender  [ ]PEG   [ ]OGT/ NGT   Last BM:     [ ] Other    Genitourinary:  [ ] Normal [ ] Incontinent   [ ] Oliguria/Anuria   [x ] Chawla  [ ] Other       Musculoskeletal:    [ ] Normal   [ x] Weakness  [ ] Edema  [x ] Bedbound/Wheelchair bound [ ]  Ambulatory [ ] with [ ] without assistance [ ] Functional quadriplegia    Neurological: [ ] No focal deficits  [ x] Cognitive impairment  [ ] Dysphagia [ ] Dysarthria [ ] Paresis [ ] Other   [ ] Brain compression  [ ] Cerebral edema [ ] Encephalopathy    Skin: x[ ] Normal   [ ] Ulcer(s) type [ ] Diabetic [ ] Pressure [ ] Other   Stage        POA [ ]  Yes [ ]  No       [ ] Rash    LABS:                        14.8   18.8  )-----------( 214      ( 30 Mar 2018 11:38 )             42.9     03-30    130<L>  |  96  |  31<H>  ----------------------------<  132<H>  6.6<HH>   |  23  |  0.46<L>    Ca    9.6      30 Mar 2018 11:38    TPro  7.4  /  Alb  3.5  /  TBili  0.8  /  DBili  x   /  AST  51<H>  /  ALT  46<H>  /  AlkPhos  104  03-30    PT/INR - ( 30 Mar 2018 11:44 )   PT: 10.8 sec;   INR: 0.99 ratio         PTT - ( 30 Mar 2018 11:44 )  PTT:21.6 sec  Urinalysis Basic - ( 30 Mar 2018 12:01 )    Color: Yellow / Appearance: Clear / S.016 / pH: x  Gluc: x / Ketone: Negative  / Bili: Negative / Urobili: Negative   Blood: x / Protein: Trace / Nitrite: Negative   Leuk Esterase: Negative / RBC: x / WBC x   Sq Epi: x / Non Sq Epi: x / Bacteria: x    Protein Calorie Malnutrition: [ ] Mild [ x] Moderate [ ] Severe    Oral Intake: [ ] Unable/mouth care only [x ] Minimal [ ] Moderate [ ] Full Capability  Diet: [ ] NPO [ ] Tube feeds [ ] TPN [x ] Other pleasure feeds    RADIOLOGY & ADDITIONAL STUDIES:  Reviewed studies from 3/30/18.      REFERRALS:   [x ] Chaplaincy  [ ] Hospice Care  [ ] Child Life  [ x] Social Work  [x ] Case management [ ] Nutrition [ ] Holistic Therapy [ ] Wound Care [ ]Physical Therapy [ ] Other [ ]See goals of care note in [facility]

## 2018-03-31 NOTE — PROGRESS NOTE ADULT - PROBLEM SELECTOR PLAN 5
Patient is DNR/DNI, does not have HCP, has 5 children main decision maker is Estela López (the rest of children do not speak english), discussed with patient's family they do not want any further blood draws, no IV fluids, abx only if it will aide in symptom management, no artificial nutrition. Patient to be admitted to PCU Met with granddaughter at bedside.  Patient has 5 children and main decision maker is Estela López (the rest of children do not speak english).  Per discussion with palliative care team on 3/30, it was discussed the family wishes to pursue symptom management and they do not want any further blood draws, no IV fluids, abx only if it will aide in symptom management, no artificial nutrition.  Will continue to offer psycho-social support to pt and family

## 2018-03-31 NOTE — PROGRESS NOTE ADULT - PROBLEM SELECTOR PLAN 1
Headache secondary to SDH - pain may continue to escalate.  Patient has been giving one dose of Dilaudid 0.2mg IV today.  Will continue to monitor 24 hour use and adjust as needed.  Continue Dexamethasone 2mg IV daily.  Family aware and agree to plan

## 2018-04-01 VITALS
DIASTOLIC BLOOD PRESSURE: 49 MMHG | SYSTOLIC BLOOD PRESSURE: 82 MMHG | OXYGEN SATURATION: 90 % | HEART RATE: 114 BPM | TEMPERATURE: 99 F

## 2018-04-01 PROCEDURE — 82803 BLOOD GASES ANY COMBINATION: CPT

## 2018-04-01 PROCEDURE — 85730 THROMBOPLASTIN TIME PARTIAL: CPT

## 2018-04-01 PROCEDURE — 82947 ASSAY GLUCOSE BLOOD QUANT: CPT

## 2018-04-01 PROCEDURE — 71045 X-RAY EXAM CHEST 1 VIEW: CPT

## 2018-04-01 PROCEDURE — 70450 CT HEAD/BRAIN W/O DYE: CPT

## 2018-04-01 PROCEDURE — 84295 ASSAY OF SERUM SODIUM: CPT

## 2018-04-01 PROCEDURE — 74177 CT ABD & PELVIS W/CONTRAST: CPT

## 2018-04-01 PROCEDURE — 82435 ASSAY OF BLOOD CHLORIDE: CPT

## 2018-04-01 PROCEDURE — 87086 URINE CULTURE/COLONY COUNT: CPT

## 2018-04-01 PROCEDURE — 84132 ASSAY OF SERUM POTASSIUM: CPT

## 2018-04-01 PROCEDURE — 83605 ASSAY OF LACTIC ACID: CPT

## 2018-04-01 PROCEDURE — 85610 PROTHROMBIN TIME: CPT

## 2018-04-01 PROCEDURE — 93005 ELECTROCARDIOGRAM TRACING: CPT

## 2018-04-01 PROCEDURE — 80053 COMPREHEN METABOLIC PANEL: CPT

## 2018-04-01 PROCEDURE — 85027 COMPLETE CBC AUTOMATED: CPT

## 2018-04-01 PROCEDURE — 81003 URINALYSIS AUTO W/O SCOPE: CPT

## 2018-04-01 PROCEDURE — 99233 SBSQ HOSP IP/OBS HIGH 50: CPT | Mod: GC

## 2018-04-01 PROCEDURE — 85014 HEMATOCRIT: CPT

## 2018-04-01 PROCEDURE — 99285 EMERGENCY DEPT VISIT HI MDM: CPT

## 2018-04-01 PROCEDURE — 82330 ASSAY OF CALCIUM: CPT

## 2018-04-01 RX ADMIN — SODIUM CHLORIDE 10 MILLILITER(S): 9 INJECTION INTRAMUSCULAR; INTRAVENOUS; SUBCUTANEOUS at 05:35

## 2018-04-01 RX ADMIN — HYDROMORPHONE HYDROCHLORIDE 0.2 MILLIGRAM(S): 2 INJECTION INTRAMUSCULAR; INTRAVENOUS; SUBCUTANEOUS at 05:33

## 2018-04-01 RX ADMIN — ROBINUL 0.4 MILLIGRAM(S): 0.2 INJECTION INTRAMUSCULAR; INTRAVENOUS at 05:33

## 2018-04-01 RX ADMIN — ROBINUL 0.4 MILLIGRAM(S): 0.2 INJECTION INTRAMUSCULAR; INTRAVENOUS at 00:26

## 2018-04-01 NOTE — PROVIDER CONTACT NOTE (OTHER) - ASSESSMENT
no response to external stimuli  no spontaneous respirations  no apical heart beat  negative pupillary response to light

## 2018-04-01 NOTE — DISCHARGE NOTE FOR THE EXPIRED PATIENT - HOSPITAL COURSE
89 year old Female with h/o GERD, not on any blood thinners with a recent admission for fall with (3/18/18) SDH and discharged on 3/24 presents to the ED for generalized weakness, fatigue and not tolerating PO.     Per patient's daughter - Estela patient had been doing well since discharge but for the past few days she became more lethargic and less responsive. Daughter only noticed that 2 days ago patient c/o periumbilical abdominal pain which resolved with Omeprazole.     In ED patient had CTB showing Interval increase in size and liquefaction of a right holohemispheric subdural hematoma measuring up to 1.8 cm in greatest thickness with midline shift, patient evaluated by neurosurgery who offered drainage of SDH, patient's family do not want to pursue with any surgical interventions, main goal is for patient to be comfortable and not to be in pain. Patient will be admitted to PCU for symptom management in the setting of enlarging SDH with midline shift.    Upon admission to PCU, patient appeared somnolent. Her family concerned about pain/headache and Dilaudid was available PRN.  Patient became increasingly lethargic and  comfortably on  surrounded by family members.  Case was discussed with medical examiner who accepted the case (Dr. Weems).

## 2018-04-03 ENCOUNTER — APPOINTMENT (OUTPATIENT)
Dept: NEUROSURGERY | Facility: CLINIC | Age: 83
End: 2018-04-03

## 2018-04-03 ENCOUNTER — APPOINTMENT (OUTPATIENT)
Dept: INTERNAL MEDICINE | Facility: CLINIC | Age: 83
End: 2018-04-03

## 2019-06-22 NOTE — OCCUPATIONAL THERAPY INITIAL EVALUATION ADULT - ADDITIONAL COMMENTS
Name band; CT Head 3/21: Subdural hematoma again identified as described above.  MR Head 3/17: Moderately large right hemispheric subdural hematoma as described on the immediately preceding CT report. Chronic microvascular ischemic change with microhemorrhages implying chronic hypertensive angiopathy

## 2020-12-15 PROBLEM — J06.9 URTI (ACUTE UPPER RESPIRATORY INFECTION): Status: RESOLVED | Noted: 2017-06-08 | Resolved: 2020-12-15

## 2023-07-06 NOTE — PROGRESS NOTE ADULT - SUBJECTIVE AND OBJECTIVE BOX
How Severe Are Your Molluscum?: moderate Is This A New Presentation, Or A Follow-Up?: Follow Up Molluscum Contagiosum SUBJECTIVE: Pt seen and examined, awake and alert lying in bed, family at bedside    OVERNIGHT EVENTS: none    Vital Signs Last 24 Hrs  T(C): 36.6 (21 Mar 2018 12:25), Max: 36.7 (20 Mar 2018 16:16)  T(F): 97.8 (21 Mar 2018 12:25), Max: 98.1 (20 Mar 2018 16:16)  HR: 72 (21 Mar 2018 12:25) (64 - 72)  BP: 140/66 (21 Mar 2018 12:25) (137/64 - 156/72)  BP(mean): --  RR: 18 (21 Mar 2018 12:25) (18 - 20)  SpO2: 97% (21 Mar 2018 12:25) (95% - 97%)    PHYSICAL EXAM:    General: No Acute Distress     Neurological: Awake, alert oriented to person and place, Following Commands, PERRL, EOMI, Face Symmetrical, Speech Fluent, Moving all extremities, Muscle Strength normal in all four extremities, No Drift, Sensation to Light Touch Intact    Pulmonary: Clear to Auscultation, No Rales, No Rhonchi, No Wheezes     Cardiovascular: S1, S2, Regular Rate and Rhythm     Gastrointestinal: Soft, Nontender, Nondistended     Incision:     LABS:   03-21    139  |  103  |  36<H>  ----------------------------<  129<H>  4.6   |  26  |  0.53    Ca    9.3      21 Mar 2018 06:43          DRAINS:     MEDICATIONS:  Antibiotics:    Neuro:  levETIRAcetam  IVPB 500 milliGRAM(s) IV Intermittent every 12 hours    Cardiac:  hydrALAZINE Injectable 10 milliGRAM(s) IV Push every 4 hours PRN SBP>160    Pulm:  ALBUTerol    90 MICROgram(s) HFA Inhaler 1 Puff(s) Inhalation every 4 hours PRN Shortness of Breath and/or Wheezing    GI/:    Other:   dexamethasone  Injectable 4 milliGRAM(s) IV Push every 4 hours  sodium chloride 0.9% with potassium chloride 20 mEq/L 1000 milliLiter(s) IV Continuous <Continuous>    DIET: [] Regular [] CCD [] Renal [] Puree [] Dysphagia [] Tube Feeds:     IMAGING: SUBJECTIVE: Pt seen and examined, awake and alert lying in bed, family at bedside    OVERNIGHT EVENTS: none    Vital Signs Last 24 Hrs  T(C): 36.6 (21 Mar 2018 12:25), Max: 36.7 (20 Mar 2018 16:16)  T(F): 97.8 (21 Mar 2018 12:25), Max: 98.1 (20 Mar 2018 16:16)  HR: 72 (21 Mar 2018 12:25) (64 - 72)  BP: 140/66 (21 Mar 2018 12:25) (137/64 - 156/72)  BP(mean): --  RR: 18 (21 Mar 2018 12:25) (18 - 20)  SpO2: 97% (21 Mar 2018 12:25) (95% - 97%)    PHYSICAL EXAM:    General: No Acute Distress     Neurological: Awake, alert oriented to person and place, Following simple Commands,  Speech Fluent [place] speaking, Moving all extremities purposefully,     Pulmonary: Clear to Auscultation, No Rales, No Rhonchi, No Wheezes     Cardiovascular: S1, S2, Regular Rate and Rhythm     Gastrointestinal: Soft, Nontender, Nondistended     Incision: none    LABS:   03-21    139  |  103  |  36<H>  ----------------------------<  129<H>  4.6   |  26  |  0.53    Ca    9.3      21 Mar 2018 06:43          DRAINS: none    MEDICATIONS:  Antibiotics:    Neuro:  levETIRAcetam  IVPB 500 milliGRAM(s) IV Intermittent every 12 hours    Cardiac:  hydrALAZINE Injectable 10 milliGRAM(s) IV Push every 4 hours PRN SBP>160    Pulm:  ALBUTerol    90 MICROgram(s) HFA Inhaler 1 Puff(s) Inhalation every 4 hours PRN Shortness of Breath and/or Wheezing    GI/:    Other:   dexamethasone  Injectable 4 milliGRAM(s) IV Push every 4 hours  sodium chloride 0.9% with potassium chloride 20 mEq/L 1000 milliLiter(s) IV Continuous <Continuous>    DIET: [] Regular [] CCD [] Renal [x] Puree [] Dysphagia [] Tube Feeds:     IMAGING:   < from: CT Head No Cont (03.21.18 @ 08:48) >    Multiple axial sections were performed base of skull to vertex without   contrast enhancement. Coronal and sagittal reconstructions were performed   as well    This exam is compared with prior noncontrast head CT performed on March 17, 2018.    Abnormal extra-axial area of high attenuation involving the right   temporal frontal and parietal region is againseen. This is compatible   with patient's known acute on chronic subdural hematoma. This subdural   hematoma measures approximately 1.6 cm in widest diameter and previously   measured approximately 1.7 cm in widest diameter.    Small subcentimeter focus of high attenuation involving the superior   medial aspect of the right frontal lobe is again seen and unchanged. This   finding measures approximately 5.9 mm and is likely compatible with a   hemorrhagic contusion.    Mass effect on the right lateral ventricle is identified with right to   left shift (2.0 mm).     Evaluation of the osseous structures with the appropriate window appears   unremarkable    Right sphenoid sinus mucosal thickening is seen.    Both mastoid and middle ear appear clear    Impression: Subdural hematoma again identified as described above.      < end of copied text >

## 2023-10-03 NOTE — OCCUPATIONAL THERAPY INITIAL EVALUATION ADULT - VISUAL ASSESSMENT: TRACKING
normal Xeljanz Counseling: I discussed with the patient the risks of Xeljanz therapy including increased risk of infection, liver issues, headache, diarrhea, or cold symptoms. Live vaccines should be avoided. They were instructed to call if they have any problems.

## 2025-05-28 NOTE — PHYSICAL THERAPY INITIAL EVALUATION ADULT - STRENGTHENING, PT EVAL
28-May-2025 10:30 GOAL: Pt will increase BUE/BLE strength by 1/2 grade in 2wks to improve overall functional mobility

## 2025-07-28 NOTE — DISCHARGE NOTE ADULT - THE PATIENT HAS
----- Message from Tj Luna DO sent at 7/28/2025  1:00 PM CDT -----  No acute changes on chest x-ray   difficulty remembering